# Patient Record
Sex: MALE | Race: WHITE | NOT HISPANIC OR LATINO | Employment: OTHER | URBAN - METROPOLITAN AREA
[De-identification: names, ages, dates, MRNs, and addresses within clinical notes are randomized per-mention and may not be internally consistent; named-entity substitution may affect disease eponyms.]

---

## 2017-04-27 ENCOUNTER — OFFICE VISIT (OUTPATIENT)
Dept: FAMILY MEDICINE CLINIC | Facility: CLINIC | Age: 82
End: 2017-04-27

## 2017-04-27 VITALS
WEIGHT: 190 LBS | OXYGEN SATURATION: 96 % | BODY MASS INDEX: 28.79 KG/M2 | HEIGHT: 68 IN | TEMPERATURE: 97.9 F | SYSTOLIC BLOOD PRESSURE: 141 MMHG | HEART RATE: 68 BPM | DIASTOLIC BLOOD PRESSURE: 70 MMHG

## 2017-04-27 DIAGNOSIS — M51.36 DEGENERATIVE DISC DISEASE, LUMBAR: ICD-10-CM

## 2017-04-27 DIAGNOSIS — M25.531 RIGHT WRIST PAIN: ICD-10-CM

## 2017-04-27 DIAGNOSIS — Z00.00 MEDICARE ANNUAL WELLNESS VISIT, INITIAL: Primary | ICD-10-CM

## 2017-04-27 DIAGNOSIS — E78.2 MIXED HYPERLIPIDEMIA: ICD-10-CM

## 2017-04-27 PROCEDURE — 99204 OFFICE O/P NEW MOD 45 MIN: CPT | Performed by: FAMILY MEDICINE

## 2017-04-27 PROCEDURE — G0439 PPPS, SUBSEQ VISIT: HCPCS | Performed by: FAMILY MEDICINE

## 2017-04-27 RX ORDER — PAROXETINE 10 MG/1
10 TABLET, FILM COATED ORAL EVERY MORNING
COMMUNITY
End: 2017-09-19 | Stop reason: SDUPTHER

## 2017-04-27 NOTE — PROGRESS NOTES
QUICK REFERENCE INFORMATION:  The ABCs of the Annual Wellness Visit    Initial Medicare Wellness Visit    HEALTH RISK ASSESSMENT    8/20/1930    Recent Hospitalizations:  No recent hospitalization(s)..        Current Medical Providers:  Patient Care Team:  Kvng Hogue MD as PCP - General (Family Medicine)  Sam Maravilla MD as Consulting Physician (Urology)  Martin Betancourt MD as Consulting Physician (Dermatology)        Smoking Status:  History   Smoking Status   • Never Smoker   Smokeless Tobacco   • Not on file       Alcohol Consumption:  History   Alcohol Use No       Depression Screen:   PHQ-9 Depression Screening 4/27/2017   Little interest or pleasure in doing things 0   Feeling down, depressed, or hopeless 0   Trouble falling or staying asleep, or sleeping too much 0   Feeling tired or having little energy 0   Poor appetite or overeating 0   Feeling bad about yourself - or that you are a failure or have let yourself or your family down 0   Trouble concentrating on things, such as reading the newspaper or watching television 0   Moving or speaking so slowly that other people could have noticed. Or the opposite - being so fidgety or restless that you have been moving around a lot more than usual 0   Thoughts that you would be better off dead, or of hurting yourself in some way 0   PHQ-9 Total Score 0   If you checked off any problems, how difficult have these problems made it for you to do your work, take care of things at home, or get along with other people? Not difficult at all       Health Habits and Functional and Cognitive Screening:  Functional & Cognitive Status 4/27/2017   Do you have difficulty preparing food and eating? No   Do you have difficulty bathing yourself? No   Do you have difficulty getting dressed? Yes   Do you have difficulty using the toilet? No   Do you have difficulty moving around from place to place? Yes   In the past year have you fallen or experienced a near fall? No   Do  you need help using the phone?  No   Are you deaf or do you have serious difficulty hearing?  No   Do you need help with transportation? No   Do you need help shopping? No   Do you need help preparing meals?  No   Do you need help with housework?  No   Do you need help with laundry? No   Do you need help taking your medications? No   Do you need help managing money? No   Do you have difficulty concentrating, remembering or making decisions? No       Health Habits  Current Diet: Well Balanced Diet  Dental Exam: Up to date  Eye Exam: Up to date  Exercise (times per week): 3 times per week  Current Exercise Activities Include: Cardiovasular Workout on Exercise Equipment          Does the patient have evidence of cognitive impairment? No    Asiprin use counseling: Taking ASA appropriately as indicated      Recent Lab Results:    Visual Acuity:  No exam data present    Age-appropriate Screening Schedule:  Refer to the list below for future screening recommendations based on patient's age, sex and/or medical conditions. Orders for these recommended tests are listed in the plan section. The patient has been provided with a written plan.    Health Maintenance   Topic Date Due   • TDAP/TD VACCINES (1 - Tdap) 08/20/1949   • ZOSTER VACCINE  04/27/2017   • PNEUMOCOCCAL VACCINES (65+ LOW/MEDIUM RISK) (2 of 2 - PPSV23) 10/12/2017   • INFLUENZA VACCINE  Completed        Subjective   History of Present Illness    Enrique Gillette is a 86 y.o. male who presents for an Annual Wellness Visit.    The following portions of the patient's history were reviewed and updated as appropriate: allergies, current medications, past family history, past medical history, past social history, past surgical history and problem list.    Outpatient Medications Prior to Visit   Medication Sig Dispense Refill   • acetaminophen (TYLENOL) 500 MG tablet Take 500 mg by mouth every 6 (six) hours as needed for mild pain (1-3).     • aspirin 81 MG EC tablet Take  "81 mg by mouth daily.     • CRESTOR 10 MG tablet      • Pyridoxine HCl (VITAMIN B-6 PO) Take  by mouth.     • Vitamin D, Cholecalciferol, 1000 UNITS capsule Take  by mouth.       No facility-administered medications prior to visit.        Patient Active Problem List   Diagnosis   • Stenosis, spinal, lumbar       Advance Care Planning:  has an advance directive - a copy HAS NOT been provided    Identification of Risk Factors:  Risk factors include: N/A.    Review of Systems    Compared to one year ago, the patient feels his physical health is the same.  Compared to one year ago, the patient feels his mental health is the same.    Objective     Physical Exam    Vitals:    04/27/17 0958   BP: 141/70   BP Location: Left arm   Patient Position: Sitting   Cuff Size: Adult   Pulse: 68   Temp: 97.9 °F (36.6 °C)   TempSrc: Oral   SpO2: 96%   Weight: 190 lb (86.2 kg)   Height: 68\" (172.7 cm)   PainSc:   6   PainLoc: Back       Body mass index is 28.89 kg/(m^2).  Discussed the patient's BMI with him. The BMI is in the acceptable range.    Assessment/Plan   Patient Self-Management and Personalized Health Advice  The patient has been provided with information about: exercise and preventive services including:   · Fall Risk assessment done.    Visit Diagnoses:  No diagnosis found.    No orders of the defined types were placed in this encounter.      Outpatient Encounter Prescriptions as of 4/27/2017   Medication Sig Dispense Refill   • acetaminophen (TYLENOL) 500 MG tablet Take 500 mg by mouth every 6 (six) hours as needed for mild pain (1-3).     • aspirin 81 MG EC tablet Take 81 mg by mouth daily.     • CRESTOR 10 MG tablet      • PARoxetine (PAXIL) 10 MG tablet Take 10 mg by mouth Every Morning.     • Pyridoxine HCl (VITAMIN B-6 PO) Take  by mouth.     • [DISCONTINUED] Vitamin D, Cholecalciferol, 1000 UNITS capsule Take  by mouth.       No facility-administered encounter medications on file as of 4/27/2017.        Reviewed use " of high risk medication in the elderly: yes  Reviewed for potential of harmful drug interactions in the elderly: not applicable    Follow Up:  No Follow-up on file.     An After Visit Summary and PPPS with all of these plans were given to the patient.        SUBJECTIVE:  The patient is an 86-year-old white male who is here for the first time.  He is in generally good health.  He has a history of hyperlipidemia and degenerative disc disease.  His current medications include Crestor 10 mg daily Paxil 10 mg daily vitamin B6 daily Tylenol as needed.  He complains of back pain however after extensive and thorough workup he finds relief in seen a chiropractor once a month.  He gets his prostate examined regularly.  He states he is up-to-date on colonoscopy.  He plays golf once a week.    Family history reviewed  surgical history reviewed  Social history reviewed  Allergies-none known     PAST MEDICAL HISTORY:  Reviewed.    REVIEW OF SYSTEMS:  Please see above; 14 point ROS otherwise negative.      OBJECTIVE: Vitals signs are reviewed and are stable.    HEENT: PERRLA. []  Neck:  Supple.[]  Lungs:  Clear.    Heart:  Regular rate and rhythm.[]  Abdomen:   Soft, nontender.[]  Extremities:  No cyanosis, clubbing or edema.[]Tenderness is present on the radial aspect of the right wrist.  Range of motion full.    ASSESSMENT:  Hyperlipidemia   tendinitis right wrist  Degenerative disc disease    PLAN: She will return for fasting labs.  Wrist splints as needed for tendinitis.  Diet and exercise discussed.  MedicationMuch of this encounter note is an electronic transcription/translation of spoken language to printed text.  The electronic translation of spoken language may permit erroneous, or at times, nonsensical words or phrases to be inadvertently transcribed.  Although I have reviewed the note for such errors, some may still exist. will be refilled.  Call if problems.                  []    PLAN []

## 2017-04-27 NOTE — PATIENT INSTRUCTIONS
Medicare Wellness  Personal Prevention Plan of Service     Date of Office Visit:  2017  Encounter Provider:  Kvng Hogue MD  Place of Service:  [facility]  Patient Name: Enrique Gillette  :  1930    As part of the Medicare Wellness portion of your visit today, we are providing you with this personalized preventive plan of services (PPPS). This plan is based upon recommendations of the [place] Preventive Services Task Force (USPSTF) and the Advisory Committee on Immunization Practices (ACIP).    This lists the preventive care services that should be considered, and provides dates of when you are due. Items listed as completed are up-to-date and do not require any further intervention.    Health Maintenance   Topic Date Due   • TDAP/TD VACCINES (1 - Tdap) 1949   • LIPID PANEL  2017   • ZOSTER VACCINE  2017   • MEDICARE ANNUAL WELLNESS  2017   • PNEUMOCOCCAL VACCINES (65+ LOW/MEDIUM RISK) (2 of 2 - PPSV23) 10/12/2017   • INFLUENZA VACCINE  Completed       No orders of the defined types were placed in this encounter.      No Follow-up on file.

## 2017-05-22 ENCOUNTER — LAB (OUTPATIENT)
Dept: FAMILY MEDICINE CLINIC | Facility: CLINIC | Age: 82
End: 2017-05-22

## 2017-05-22 DIAGNOSIS — E78.5 HYPERLIPIDEMIA, UNSPECIFIED HYPERLIPIDEMIA TYPE: Primary | ICD-10-CM

## 2017-05-22 LAB
ALBUMIN SERPL-MCNC: 4.1 G/DL (ref 3.5–5.2)
ALBUMIN/GLOB SERPL: 1.4 G/DL
ALP SERPL-CCNC: 62 U/L (ref 39–117)
ALT SERPL W P-5'-P-CCNC: 15 U/L (ref 1–41)
ANION GAP SERPL CALCULATED.3IONS-SCNC: 13 MMOL/L
AST SERPL-CCNC: 28 U/L (ref 1–40)
BILIRUB SERPL-MCNC: 0.6 MG/DL (ref 0.1–1.2)
BUN BLD-MCNC: 17 MG/DL (ref 8–23)
BUN/CREAT SERPL: 14.4 (ref 7–25)
CALCIUM SPEC-SCNC: 9.4 MG/DL (ref 8.6–10.5)
CHLORIDE SERPL-SCNC: 104 MMOL/L (ref 98–107)
CHOLEST SERPL-MCNC: 120 MG/DL (ref 0–200)
CO2 SERPL-SCNC: 26 MMOL/L (ref 22–29)
CREAT BLD-MCNC: 1.18 MG/DL (ref 0.76–1.27)
GFR SERPL CREATININE-BSD FRML MDRD: 59 ML/MIN/1.73
GLOBULIN UR ELPH-MCNC: 3 GM/DL
GLUCOSE BLD-MCNC: 94 MG/DL (ref 65–99)
HDLC SERPL-MCNC: 44 MG/DL (ref 40–60)
LDLC SERPL CALC-MCNC: 51 MG/DL (ref 0–100)
LDLC/HDLC SERPL: 1.15 {RATIO}
POTASSIUM BLD-SCNC: 4.4 MMOL/L (ref 3.5–5.2)
PROT SERPL-MCNC: 7.1 G/DL (ref 6–8.5)
SODIUM BLD-SCNC: 143 MMOL/L (ref 136–145)
TRIGL SERPL-MCNC: 127 MG/DL (ref 0–150)
VLDLC SERPL-MCNC: 25.4 MG/DL (ref 5–40)

## 2017-05-22 PROCEDURE — 80061 LIPID PANEL: CPT | Performed by: FAMILY MEDICINE

## 2017-05-22 PROCEDURE — 80053 COMPREHEN METABOLIC PANEL: CPT | Performed by: FAMILY MEDICINE

## 2017-06-28 ENCOUNTER — HOSPITAL ENCOUNTER (OUTPATIENT)
Dept: MRI IMAGING | Facility: HOSPITAL | Age: 82
Discharge: HOME | End: 2017-06-28
Admitting: NURSE PRACTITIONER

## 2017-06-28 ENCOUNTER — OFFICE VISIT (OUTPATIENT)
Dept: FAMILY MEDICINE CLINIC | Facility: CLINIC | Age: 82
End: 2017-06-28

## 2017-06-28 VITALS
SYSTOLIC BLOOD PRESSURE: 140 MMHG | HEIGHT: 68 IN | OXYGEN SATURATION: 97 % | RESPIRATION RATE: 16 BRPM | TEMPERATURE: 97.8 F | WEIGHT: 193 LBS | HEART RATE: 75 BPM | BODY MASS INDEX: 29.25 KG/M2 | DIASTOLIC BLOOD PRESSURE: 70 MMHG

## 2017-06-28 DIAGNOSIS — R42 DIZZINESS: Primary | ICD-10-CM

## 2017-06-28 DIAGNOSIS — H61.22 LEFT EAR IMPACTED CERUMEN: ICD-10-CM

## 2017-06-28 DIAGNOSIS — R42 DIZZINESS: ICD-10-CM

## 2017-06-28 PROCEDURE — 99214 OFFICE O/P EST MOD 30 MIN: CPT | Performed by: NURSE PRACTITIONER

## 2017-06-28 PROCEDURE — 0 GADOBENATE DIMEGLUMINE 529 MG/ML SOLUTION: Performed by: NURSE PRACTITIONER

## 2017-06-28 PROCEDURE — 70553 MRI BRAIN STEM W/O & W/DYE: CPT

## 2017-06-28 PROCEDURE — 82565 ASSAY OF CREATININE: CPT

## 2017-06-28 PROCEDURE — 69209 REMOVE IMPACTED EAR WAX UNI: CPT | Performed by: NURSE PRACTITIONER

## 2017-06-28 PROCEDURE — A9577 INJ MULTIHANCE: HCPCS | Performed by: NURSE PRACTITIONER

## 2017-06-28 RX ADMIN — GADOBENATE DIMEGLUMINE 18 ML: 529 INJECTION, SOLUTION INTRAVENOUS at 17:29

## 2017-06-28 NOTE — NURSING NOTE
MRI Brain read by Dr Skinner:  No acute process..Results called by Dr Skinner to Venecia KIM.  Pt may go home.  IV site clear, removed.  Discharged to home via wheelchair and cane with help, roopa well.

## 2017-06-28 NOTE — PROGRESS NOTES
Subjective   Enrique Gillette is a 86 y.o. male.     History of Present Illness   C/o HTN, he states he checks his BP at home with a wrist cuff and has had elevated readings, he states he has been dizzy, uses a walker at home, he states his head is spinning, he does not take BP medication, he states he went to [facility] and checked his BP and it was 140/80s. He states at home with his wrist BP cuff was 122/69, HR 65, 136/72 HR 63. On right 190/100, 202/95. He denies HA, CP, SOA, change in vision, LE edema. 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 He states the dizziness started yesterday morning when he got out of bed, he used his walker to sit down. He states he sat down on the couch, ate breakfast and got better, he states the same thing happened this morning. He states he feels like the room is spinning, states he felt weak, denies confusion, lives with wife. He states he has been on paxil about 9 months. He denies urinary symptoms, palpitations. Dizziness is a new problem for him, his wife is in the exam room today. He has never seen cardiology. He does have a hx of cerumen impaction. Denies N/V.    The following portions of the patient's history were reviewed and updated as appropriate: allergies, current medications, past family history, past medical history, past social history, past surgical history and problem list.    Review of Systems   Constitutional: Negative for chills, diaphoresis, fatigue and fever.   HENT: Negative for congestion, ear pain, sinus pressure and sore throat.    Eyes: Negative for photophobia, pain and visual disturbance.   Respiratory: Negative for cough and shortness of breath.    Cardiovascular: Negative for chest pain, palpitations and leg swelling.   Musculoskeletal: Negative for arthralgias and myalgias.   Skin: Negative for pallor.   Neurological: Positive for dizziness. Negative for tremors, seizures, syncope, speech difficulty, weakness, light-headedness, numbness and headaches.    Psychiatric/Behavioral: Negative for behavioral problems and confusion.   All other systems reviewed and are negative.      Objective   Physical Exam   Constitutional: He is oriented to person, place, and time. He appears well-developed and well-nourished.   HENT:   Head: Normocephalic.   Right Ear: Tympanic membrane and ear canal normal.   Nose: Nose normal.   Mouth/Throat: Uvula is midline, oropharynx is clear and moist and mucous membranes are normal.   Left cerumen impaction   Eyes: EOM are normal. Pupils are equal, round, and reactive to light.   Neck: Normal range of motion. Neck supple. Carotid bruit is not present. No thyromegaly present.   Cardiovascular: Normal rate, regular rhythm and normal heart sounds.    Pulses:       Radial pulses are 2+ on the right side, and 2+ on the left side.        Dorsalis pedis pulses are 2+ on the right side, and 2+ on the left side.        Posterior tibial pulses are 2+ on the right side, and 2+ on the left side.   Pulmonary/Chest: Effort normal and breath sounds normal. No respiratory distress. He has no wheezes.   Musculoskeletal: Normal range of motion.   Lymphadenopathy:     He has no cervical adenopathy.   Neurological: He is alert and oriented to person, place, and time. He has normal strength. No cranial nerve deficit or sensory deficit. He displays a negative Romberg sign.   Skin: Skin is warm and dry.   Psychiatric: He has a normal mood and affect. His behavior is normal. Judgment and thought content normal.   Nursing note and vitals reviewed.  left TM WNL after lavage.     Assessment/Plan   Enrique was seen today for hypertension.    Diagnoses and all orders for this visit:    Dizziness  -     Cancel: MRI Brain With & Without Contrast; Future  -     MRI Brain With & Without Contrast; Future    Left ear impacted cerumen      Left ear lavage today, tolerated well.   Reviewed plan of care with Dr. Kvng Hogue.   D/t patient's age and symptoms, MRI brain ordered  today, will call with results.  He will f/u in office in about 1 week.   He was educated about falls precautions, he should use his walker, not cane, for balance. He should move from lying to sitting to standing very slowly and count to 10 secs at least in between position changes.   If symptoms persist or any problems or other symptoms he should go to the nearest ER or call 911.  If persists, will consider cbc, ekg, bs, ent refer.   Increase fluid intake, get plenty of rest.   Patient agrees with plan of care and understands instructions. Call if worsening symptoms or any problems or concerns.

## 2017-06-28 NOTE — PATIENT INSTRUCTIONS
Left ear lavage today,   Reviewed plan of care with Dr. Kvng Hogue.   D/t patient's age and symptoms, MRI brain ordered today, will call with results.  He will f/u in office in about 1 week.   He was educated about falls precautions, he should use his walker, not cane, for balance. He should move from lying to sitting to standing very slowly and count to 10 secs at least in between position changes.   If symptoms persist or any problems or other symptoms he should go to the nearest ER or call 911.  If persists, will consider cbc, ekg, ent refer.   Increase fluid intake, get plenty of rest.   Patient agrees with plan of care and understands instructions. Call if worsening symptoms or any problems or concerns.

## 2017-06-29 ENCOUNTER — DOCUMENTATION (OUTPATIENT)
Dept: FAMILY MEDICINE CLINIC | Facility: CLINIC | Age: 82
End: 2017-06-29

## 2017-06-29 LAB — CREAT BLDA-MCNC: 1.1 MG/DL (ref 0.6–1.3)

## 2017-06-29 RX ORDER — MECLIZINE HCL 12.5 MG/1
12.5 TABLET ORAL 3 TIMES DAILY PRN
Qty: 12 TABLET | Refills: 0 | Status: SHIPPED | OUTPATIENT
Start: 2017-06-29 | End: 2018-06-12

## 2017-06-29 NOTE — PROGRESS NOTES
Called patient, informed or normal brain MRI for age, spoke with Dr. Hogue about plan of care, will trial meclizine 12.5mg up to 3x day as needed for dizziness, only take as needed, he will call with progress tomorrow, and f/u with Dr. Hogue next week. He will go to the ER if symptoms persist or worsen. Patient and wife understands instructions.

## 2017-06-30 ENCOUNTER — TELEPHONE (OUTPATIENT)
Dept: FAMILY MEDICINE CLINIC | Facility: CLINIC | Age: 82
End: 2017-06-30

## 2017-07-03 ENCOUNTER — TELEPHONE (OUTPATIENT)
Dept: FAMILY MEDICINE CLINIC | Facility: CLINIC | Age: 82
End: 2017-07-03

## 2017-07-05 ENCOUNTER — TELEPHONE (OUTPATIENT)
Dept: FAMILY MEDICINE CLINIC | Facility: CLINIC | Age: 82
End: 2017-07-05

## 2017-09-19 RX ORDER — PAROXETINE 10 MG/1
10 TABLET, FILM COATED ORAL EVERY MORNING
Qty: 30 TABLET | Refills: 2 | Status: SHIPPED | OUTPATIENT
Start: 2017-09-19 | End: 2018-06-12 | Stop reason: ALTCHOICE

## 2018-03-12 ENCOUNTER — TELEPHONE (OUTPATIENT)
Dept: FAMILY MEDICINE CLINIC | Facility: CLINIC | Age: 83
End: 2018-03-12

## 2018-03-12 RX ORDER — ROSUVASTATIN CALCIUM 10 MG/1
10 TABLET, COATED ORAL DAILY
Qty: 90 TABLET | Refills: 0 | Status: SHIPPED | OUTPATIENT
Start: 2018-03-12 | End: 2018-03-12 | Stop reason: SDUPTHER

## 2018-03-12 RX ORDER — ROSUVASTATIN CALCIUM 10 MG/1
10 TABLET, COATED ORAL DAILY
Qty: 90 TABLET | Refills: 0 | Status: SHIPPED | OUTPATIENT
Start: 2018-03-12 | End: 2018-06-11 | Stop reason: SDUPTHER

## 2018-03-20 ENCOUNTER — PRIOR AUTHORIZATION (OUTPATIENT)
Dept: FAMILY MEDICINE CLINIC | Facility: CLINIC | Age: 83
End: 2018-03-20

## 2018-03-20 NOTE — TELEPHONE ENCOUNTER
03/20 Called [facility] and spoke with Giorgi. PA was approved for Rosuvastatin from 02-18-18 through 03-20-19. PA approval # is [id number]. Pt has been notified.

## 2018-05-01 ENCOUNTER — OFFICE VISIT (OUTPATIENT)
Dept: FAMILY MEDICINE CLINIC | Facility: CLINIC | Age: 83
End: 2018-05-01

## 2018-05-01 VITALS
WEIGHT: 194 LBS | TEMPERATURE: 98.4 F | DIASTOLIC BLOOD PRESSURE: 70 MMHG | HEIGHT: 68 IN | BODY MASS INDEX: 29.4 KG/M2 | SYSTOLIC BLOOD PRESSURE: 116 MMHG | HEART RATE: 46 BPM | OXYGEN SATURATION: 99 %

## 2018-05-01 DIAGNOSIS — I45.10 RIGHT BUNDLE BRANCH BLOCK: ICD-10-CM

## 2018-05-01 DIAGNOSIS — M25.561 RIGHT KNEE PAIN, UNSPECIFIED CHRONICITY: Primary | ICD-10-CM

## 2018-05-01 PROCEDURE — 73560 X-RAY EXAM OF KNEE 1 OR 2: CPT | Performed by: FAMILY MEDICINE

## 2018-05-01 PROCEDURE — 99213 OFFICE O/P EST LOW 20 MIN: CPT | Performed by: FAMILY MEDICINE

## 2018-05-01 PROCEDURE — 93000 ELECTROCARDIOGRAM COMPLETE: CPT | Performed by: FAMILY MEDICINE

## 2018-05-01 NOTE — PROGRESS NOTES
SUBJECTIVE:  The patient is an 87-year-old white male who comes in primarily because of his right knee swelling.  Upon initial exam his vital signs showed a bradycardia and irregular beat.  He knows of no heart issues.  He denies chest pain or shortness of breath.  He appears to have some minor cognitive impairment.  He is here by himself.  I saw him once before with his wife and she definitely had cognitive impairment.    PAST MEDICAL HISTORY:  Reviewed.    REVIEW OF SYSTEMS:  Please see above; 14 point ROS negative.      OBJECTIVE: Vitals signs are reviewed and are stable.    HEENT: PERRLA.   Neck:  Supple.   Lungs:  Clear.    Heart:  Regular rate and rhythm.   Abdomen:   Soft, nontender.   Extremities:  No cyanosis, clubbing or edema.       ECG 12 Lead  Date/Time: 5/1/2018 11:48 AM  Performed by: CAESAR MARION  Authorized by: CAESAR MARION   Rhythm: sinus rhythm  Conduction: right bundle branch block        EKG is done here and interpreted by me.  None for comparison.  Indication arrhythmia.  EKG shows sinus rhythm with right bundle branch block.  Two-view x-ray of the right knee is done here and interpreted by me.  Indication right knee pain.  No old x-ray for comparison.  X-ray shows degenerative changes.  ASSESSMENT:    Right knee pain  Right bundle branch block  Hyperlipidemia      PLAN: The patient is totally asymptomatic regarding chest pain or shortness of breath.  I will refer him to a cardiologist.  He is advised should he develop any symptoms to go to the emergency room.  He will be referred to an orthopedist for his knee pain.    Dictated utilizing Dragon dictation.

## 2018-05-14 ENCOUNTER — TELEPHONE (OUTPATIENT)
Dept: FAMILY MEDICINE CLINIC | Facility: CLINIC | Age: 83
End: 2018-05-14

## 2018-05-21 ENCOUNTER — OFFICE VISIT (OUTPATIENT)
Dept: ORTHOPEDIC SURGERY | Facility: CLINIC | Age: 83
End: 2018-05-21

## 2018-05-21 VITALS — WEIGHT: 194 LBS | TEMPERATURE: 97.4 F | BODY MASS INDEX: 29.4 KG/M2 | HEIGHT: 68 IN

## 2018-05-21 DIAGNOSIS — M48.062 SPINAL STENOSIS OF LUMBAR REGION WITH NEUROGENIC CLAUDICATION: Primary | ICD-10-CM

## 2018-05-21 DIAGNOSIS — M17.11 ARTHRITIS OF RIGHT KNEE: ICD-10-CM

## 2018-05-21 PROCEDURE — 99204 OFFICE O/P NEW MOD 45 MIN: CPT | Performed by: ORTHOPAEDIC SURGERY

## 2018-05-21 NOTE — PROGRESS NOTES
"Patient Name: Enrique Gillette   YOB: 1930  Referring Primary Care Physician: Kvng Hogue MD  BMI: Body mass index is 29.5 kg/m².    Chief Complaint:    Chief Complaint   Patient presents with   • Right Knee - Establish Care, Pain    \"my back hurts all the way across\"    Subjective:    HPI:   Enrique Gillette is a pleasant 87 y.o. year old who presents today for evaluation of   Chief Complaint   Patient presents with   • Right Knee - Establish Care, Pain   86 yo man with known spinal stenosis.  Pain radiates down the right leg when he ambulates.  Saw dr silva a few yrs ago.  Had 5 epidurals and did 12 PT visits, none of which helped.  Was golfing but has given up.  Uses a cane.  Does airdyne 10-15 minutes daily.  choro weekly helps. Retired from [employer].  Has one kidney so not supposed to take nsaids.  Tylenol helps very litte.  With papin radiating down the right leg near knee wanted to check.      This problem is new to this examiner.         Medications:   Home Medications:  Current Outpatient Prescriptions on File Prior to Visit   Medication Sig   • acetaminophen (TYLENOL) 500 MG tablet Take 500 mg by mouth every 6 (six) hours as needed for mild pain (1-3).   • aspirin 81 MG EC tablet Take 81 mg by mouth daily.   • meclizine (ANTIVERT) 12.5 MG tablet Take 1 tablet by mouth 3 (Three) Times a Day As Needed for dizziness.   • PARoxetine (PAXIL) 10 MG tablet Take 1 tablet by mouth Every Morning.   • Pyridoxine HCl (VITAMIN B-6 PO) Take  by mouth.   • rosuvastatin (CRESTOR) 10 MG tablet Take 1 tablet by mouth Daily.     No current facility-administered medications on file prior to visit.      Current Medications:  Scheduled Meds:  Continuous Infusions:  No current facility-administered medications for this visit.   PRN Meds:.    I have reviewed the patient's medical history in detail and updated the computerized patient record.  Review and summarization of old records includes:    Past Medical History: " "  Diagnosis Date   • Prostate cancer         Past Surgical History:   Procedure Laterality Date   • COLONOSCOPY  2015        Social History     Occupational History   • Not on file.     Social History Main Topics   • Smoking status: Never Smoker   • Smokeless tobacco: Not on file   • Alcohol use No   • Drug use: No   • Sexual activity: Not on file      Social History     Social History Narrative   • No narrative on file        Family History   Problem Relation Age of Onset   • No Known Problems Mother    • No Known Problems Father    • No Known Problems Brother    • No Known Problems Brother    • No Known Problems Brother    • No Known Problems Brother        ROS: 14 point review of systems was performed and all other systems were reviewed and are negative except for documented findings in HPI and today's encounter.     Allergies: No Known Allergies  Constitutional:  Denies fever, shaking or chills   Eyes:  Denies change in visual acuity   HENT:  Denies nasal congestion or sore throat   Respiratory:  Denies cough or shortness of breath   Cardiovascular:  Denies chest pain or severe LE edema   GI:  Denies abdominal pain, nausea, vomiting, bloody stools or diarrhea   Musculoskeletal:  Numbness, tingling, pain, or loss of motor function only as noted above in history of present illness.  : Denies painful urination or hematuria  Integument:  Denies rash, lesion or ulceration   Neurologic:  Denies headache or focal weakness  Endocrine:  Denies lymphadenopathy  Psych:  Denies confusion or change in mental status   Hem:  Denies active bleeding    Subjective     Objective:    Physical Exam: 87 y.o. male  Wt Readings from Last 3 Encounters:   05/21/18 88 kg (194 lb)   05/01/18 88 kg (194 lb)   06/28/17 87.5 kg (193 lb)     Ht Readings from Last 3 Encounters:   05/21/18 172.7 cm (68\")   05/01/18 172.7 cm (68\")   06/28/17 172.7 cm (68\")     Body mass index is 29.5 kg/m².    Vitals:    05/21/18 0940   Temp: 97.4 °F (36.3 °C) "       Vital signs reviewed.   General Appearance:    Alert, cooperative, in no acute distress                  Eyes: conjunctiva clear  ENT: external ears and nose atraumatic  CV: no peripheral edema  Resp: normal respiratory effort  Skin: no rashes or wounds; normal turgor  Psych: mood and affect appropriate  Lymph: no nodes appreciated  Neuro: gross sensation intact  Vascular:  Palpable peripheral pulse in noted extremity  Musculoskeletal Extremities: good rom and stability.  no tenderness to palp or effusion.      Radiology:   Imaging done by outside location, images were personally viewed and discussed at length with the patient:    Indication: pain related symptoms,  Views: 2V AP&LAT right knee(s)   Findings: moderate arthritis  Comparison views: not available  Reviewed lumbar MRI that shows lumbar spinal stenosis    Assessment:     ICD-10-CM ICD-9-CM   1. Spinal stenosis of lumbar region with neurogenic claudication M48.062 724.03   2. Arthritis of right knee M17.11 716.96        Procedures       Plan: Biomechanics of pertinent body area discussed.  Risks, benefits, alternatives, comparisons, and complications of accepted medicines, injections, recommendations, surgical procedures, and therapies explained and education provided in laymen's terms. The patient was given the opportunity to ask questions and they were answerved to their satisfaction.   Natural history and expected course of this patient's diagnosis discussed along with evaluation of therapies. Questions answered.   Appears that the knee is not causing current symptomatology and recommended to see dr meier to review options for spinal stenosis.  Reviewed DR Meier's notes    5/21/2018

## 2018-06-11 RX ORDER — ROSUVASTATIN CALCIUM 10 MG/1
TABLET, COATED ORAL
Qty: 90 TABLET | Refills: 0 | Status: SHIPPED | OUTPATIENT
Start: 2018-06-11 | End: 2018-09-08 | Stop reason: SDUPTHER

## 2018-06-12 ENCOUNTER — OFFICE VISIT (OUTPATIENT)
Dept: CARDIOLOGY | Facility: CLINIC | Age: 83
End: 2018-06-12

## 2018-06-12 VITALS
HEART RATE: 67 BPM | SYSTOLIC BLOOD PRESSURE: 136 MMHG | HEIGHT: 68 IN | WEIGHT: 192 LBS | DIASTOLIC BLOOD PRESSURE: 84 MMHG | BODY MASS INDEX: 29.1 KG/M2

## 2018-06-12 DIAGNOSIS — E78.2 MIXED HYPERLIPIDEMIA: ICD-10-CM

## 2018-06-12 DIAGNOSIS — I45.10 RBBB: Primary | ICD-10-CM

## 2018-06-12 DIAGNOSIS — I49.1 APC (ATRIAL PREMATURE CONTRACTIONS): ICD-10-CM

## 2018-06-12 PROCEDURE — 99204 OFFICE O/P NEW MOD 45 MIN: CPT | Performed by: INTERNAL MEDICINE

## 2018-06-12 PROCEDURE — 93000 ELECTROCARDIOGRAM COMPLETE: CPT | Performed by: INTERNAL MEDICINE

## 2018-06-12 RX ORDER — ACETAMINOPHEN,DIPHENHYDRAMINE HCL 500; 25 MG/1; MG/1
1 TABLET, FILM COATED ORAL NIGHTLY PRN
COMMUNITY
End: 2021-10-15

## 2018-06-12 NOTE — PROGRESS NOTES
Date of Office Visit: 18  Encounter Provider: Ko Castillo MD  Place of Service: [facility] CARDIOLOGY  Patient Name: Enrique Gillette  :1930  Referral Provider:Kvng Hogue MD      Chief Complaint   Patient presents with   • Irregular Heart Beat     History of Present Illness  Mr Gillette is a pleasant 88 yo gentleman with no prior history of heart disease he does have a history of prostate cancer, hyperlipidemia, and previous normal stress testing in  had a nuclear stress is that was normal.  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  However he denies any symptoms of chest pain or pressure.  He denies any shortness of breath, orthopnea, or PND.  He denies any palpitations, near-syncope or syncope.  He does walk with a walker or a cane but his had no falling spells.  Denies any abrupt loss of vision, paralysis, paresthesia, or dysarthria.  Overall he feels like he's doing very well he has been playing golf regularly hasn't yet this year.  He does get some hot flashes which she said he's had as a residual from when he received radiation therapy for his prostate cancer.  In his biggest problem he has is his lumbar disc disease with sciatica.          Past Medical History:   Diagnosis Date   • Chest pain    • Hyperlipidemia    • Prostate CA    • Prostate cancer    • RBBB    • Right knee pain    • SOB (shortness of breath)          Past Surgical History:   Procedure Laterality Date   • CATARACT EXTRACTION Bilateral    • COLONOSCOPY           Current Outpatient Prescriptions on File Prior to Visit   Medication Sig Dispense Refill   • acetaminophen (TYLENOL) 500 MG tablet Take 500 mg by mouth every 6 (six) hours as needed for mild pain (1-3).     • aspirin 81 MG EC tablet Take 81 mg by mouth daily.     • rosuvastatin  (CRESTOR) 10 MG tablet TAKE 1 TABLET DAILY 90 tablet 0   • [DISCONTINUED] meclizine (ANTIVERT) 12.5 MG tablet Take 1 tablet by mouth 3 (Three) Times a Day As Needed for dizziness. 12 tablet 0   • [DISCONTINUED] PARoxetine (PAXIL) 10 MG tablet Take 1 tablet by mouth Every Morning. 30 tablet 2   • [DISCONTINUED] Pyridoxine HCl (VITAMIN B-6 PO) Take  by mouth.       No current facility-administered medications on file prior to visit.          Social History     Social History   • Marital status:      Spouse name: N/A   • Number of children: N/A   • Years of education: N/A     Occupational History   • Not on file.     Social History Main Topics   • Smoking status: Never Smoker   • Smokeless tobacco: Not on file   • Alcohol use No   • Drug use: No   • Sexual activity: Not on file     Other Topics Concern   • Not on file     Social History Narrative   • No narrative on file       Family History   Problem Relation Age of Onset   • No Known Problems Mother    • No Known Problems Father    • No Known Problems Brother    • No Known Problems Brother    • No Known Problems Brother    • No Known Problems Brother          Review of Systems   Constitution: Negative for decreased appetite, diaphoresis, fever, weakness, malaise/fatigue, weight gain and weight loss.   HENT: Negative for congestion, hearing loss, nosebleeds and tinnitus.    Eyes: Negative for blurred vision, double vision, vision loss in left eye, vision loss in right eye and visual disturbance.   Cardiovascular:        As noted in HPI   Respiratory:        As noted HPI   Endocrine: Negative for cold intolerance and heat intolerance.   Hematologic/Lymphatic: Negative for bleeding problem. Does not bruise/bleed easily.   Skin: Negative for color change, flushing, itching and rash.   Musculoskeletal: Positive for back pain, joint pain and muscle weakness. Negative for arthritis, joint swelling and myalgias.   Gastrointestinal: Negative for bloating, abdominal  "pain, constipation, diarrhea, dysphagia, heartburn, hematemesis, hematochezia, melena, nausea and vomiting.   Genitourinary: Negative for bladder incontinence, dysuria, frequency, nocturia and urgency.   Neurological: Negative for dizziness, focal weakness, headaches, light-headedness, loss of balance, numbness, paresthesias and vertigo.   Psychiatric/Behavioral: Negative for depression, memory loss and substance abuse.       Procedures      ECG 12 Lead  Date/Time: 6/12/2018 9:32 AM  Performed by: VERNELL GÓMEZ  Authorized by: VERNELL GÓMEZ   Comparison: compared with previous ECG   Similar to previous ECG  Rhythm: sinus rhythm  Ectopy: atrial premature contractions  Rate: normal  Conduction: incomplete RBBB, LAFB and 1st degree  QRS axis: left                  Objective:    /84 (BP Location: Right arm)   Pulse 67   Ht 172.7 cm (68\")   Wt 87.1 kg (192 lb)   BMI 29.19 kg/m²        Physical Exam  Physical Exam   Constitutional: He is oriented to person, place, and time. He appears well-developed and well-nourished. No distress.   HENT:   Head: Normocephalic.   Eyes: Conjunctivae are normal. Pupils are equal, round, and reactive to light. No scleral icterus.   Neck: Normal carotid pulses, no hepatojugular reflux and no JVD present. Carotid bruit is not present. No tracheal deviation, no edema and no erythema present. No thyromegaly present.   Cardiovascular: Normal rate, regular rhythm, S1 normal, S2 normal, normal heart sounds and intact distal pulses.   No extrasystoles are present. PMI is not displaced.  Exam reveals no gallop, no distant heart sounds and no friction rub.    No murmur heard.  Pulses:       Carotid pulses are 2+ on the right side, and 2+ on the left side.       Radial pulses are 2+ on the right side, and 2+ on the left side.        Femoral pulses are 2+ on the right side, and 2+ on the left side.       Dorsalis pedis pulses are 2+ on the right side, and 2+ on the left side.        " Posterior tibial pulses are 2+ on the right side, and 2+ on the left side.   Pulmonary/Chest: Effort normal and breath sounds normal. No respiratory distress. He has no decreased breath sounds. He has no wheezes. He has no rhonchi. He has no rales. He exhibits no tenderness.   Abdominal: Soft. Bowel sounds are normal. He exhibits no distension and no mass. There is no hepatosplenomegaly. There is no tenderness. There is no rebound and no guarding.   Musculoskeletal: He exhibits no edema, tenderness or deformity.   Neurological: He is alert and oriented to person, place, and time.   Skin: Skin is warm and dry. No rash noted. He is not diaphoretic. No cyanosis or erythema. No pallor. Nails show no clubbing.   Psychiatric: He has a normal mood and affect. His speech is normal and behavior is normal. Judgment and thought content normal.           Assessment:   1. 87-year-old gentleman with incomplete right bundle branch block, first-degree AV block, left interfascicular block.  And APCs.  But completely asymptomatic.  Since he's asymptomatic and a normal physical exam would not recommend any further evaluation or treatment we did discuss options of doing testing such as echocardiography but again not likely to make any decision changes based on that therefore he still follow for any symptoms of dizziness or lightheadedness or shortness of breath and will call us if any symptoms occur.  2 hyperlipidemia continue home therapy.  3.  History prostate cancer treated with radiation therapy.          Plan:

## 2018-07-18 ENCOUNTER — OFFICE VISIT (OUTPATIENT)
Dept: FAMILY MEDICINE CLINIC | Facility: CLINIC | Age: 83
End: 2018-07-18

## 2018-07-18 VITALS
HEIGHT: 68 IN | SYSTOLIC BLOOD PRESSURE: 120 MMHG | OXYGEN SATURATION: 97 % | WEIGHT: 192 LBS | BODY MASS INDEX: 29.1 KG/M2 | DIASTOLIC BLOOD PRESSURE: 76 MMHG | TEMPERATURE: 98.3 F | HEART RATE: 73 BPM

## 2018-07-18 DIAGNOSIS — F41.9 ANXIETY: ICD-10-CM

## 2018-07-18 DIAGNOSIS — E78.5 HYPERLIPIDEMIA, UNSPECIFIED HYPERLIPIDEMIA TYPE: Primary | ICD-10-CM

## 2018-07-18 DIAGNOSIS — M19.90 ARTHRITIS: ICD-10-CM

## 2018-07-18 PROCEDURE — 99213 OFFICE O/P EST LOW 20 MIN: CPT | Performed by: FAMILY MEDICINE

## 2018-07-18 RX ORDER — MECLIZINE HCL 12.5 MG/1
12.5 TABLET ORAL 3 TIMES DAILY PRN
Qty: 21 TABLET | Refills: 0 | Status: SHIPPED | OUTPATIENT
Start: 2018-07-18 | End: 2018-08-27 | Stop reason: SDUPTHER

## 2018-07-18 RX ORDER — PAROXETINE 10 MG/1
10 TABLET, FILM COATED ORAL EVERY MORNING
Qty: 90 TABLET | Refills: 3 | Status: SHIPPED | OUTPATIENT
Start: 2018-07-18 | End: 2019-06-23 | Stop reason: SDUPTHER

## 2018-07-18 NOTE — PROGRESS NOTES
SUBJECTIVE:  The patient is an 87-year-old white male who comes in primarily with his wife today who is cognitively impaired and needs help.  He does have a few medical problems including hyperlipidemia.  He is not exactly due labs today.  He's had a great deal of difficulty caring for his wife because of confusion over medications.  Please refer to her visit today and both patient's previous visit.  He feels okay.  He recently saw a cardiologist for abnormal findings on EKG.  However he is totally asymptomatic cardiac-wise.  He is under a lot of stress taking care of his wife.  He wants to get back on Paxil.  His meclizine needs refilling that he has for dizziness on occasion.    PAST MEDICAL HISTORY:  Reviewed.    REVIEW OF SYSTEMS:  Please see above; 14 point ROS negative.      OBJECTIVE: Vitals signs are reviewed and are stable.    HEENT: PERRLA.   Neck:  Supple.   Lungs:  Clear.    Heart:  Regular rate and rhythm.   Abdomen:   Soft, nontender.   Extremities:  No cyanosis, clubbing or edema.     ASSESSMENT:   Hyperlipidemia  Arthritis.   Anxiety  History of vertigo    PLAN: We are gathering information for his wife in someone that's and are in can help these unfortunate people manage her medication.  Her children live out of town.  The patient is advised on healthy lifestyle.  He'll be due lab in a couple months.  He will call if any problems in the interim.  His to requested medications are refilled.  He will call me if any problems with him or his wife.    Dictated utilizing Dragon dictation.

## 2018-08-27 ENCOUNTER — TELEPHONE (OUTPATIENT)
Dept: FAMILY MEDICINE CLINIC | Facility: CLINIC | Age: 83
End: 2018-08-27

## 2018-08-27 RX ORDER — MECLIZINE HCL 12.5 MG/1
12.5 TABLET ORAL 3 TIMES DAILY PRN
Qty: 30 TABLET | Refills: 2 | Status: SHIPPED | OUTPATIENT
Start: 2018-08-27

## 2018-08-27 NOTE — TELEPHONE ENCOUNTER
Is calling bc he needs meclizine (ANTIVERT) 12.5 MG tablet he prefers the generic, and this [facility] please let him have a refill, and let him know when it has been ok'ed and called in to them please. He said dr oneill does not want him to be without it

## 2018-09-08 RX ORDER — ROSUVASTATIN CALCIUM 10 MG/1
TABLET, COATED ORAL
Qty: 90 TABLET | Refills: 0 | Status: SHIPPED | OUTPATIENT
Start: 2018-09-08 | End: 2018-12-07 | Stop reason: SDUPTHER

## 2018-11-28 ENCOUNTER — OFFICE VISIT (OUTPATIENT)
Dept: FAMILY MEDICINE CLINIC | Facility: CLINIC | Age: 83
End: 2018-11-28

## 2018-11-28 VITALS
BODY MASS INDEX: 28.79 KG/M2 | HEART RATE: 70 BPM | WEIGHT: 190 LBS | RESPIRATION RATE: 18 BRPM | TEMPERATURE: 98 F | SYSTOLIC BLOOD PRESSURE: 120 MMHG | OXYGEN SATURATION: 96 % | HEIGHT: 68 IN | DIASTOLIC BLOOD PRESSURE: 64 MMHG

## 2018-11-28 DIAGNOSIS — E78.2 MIXED HYPERLIPIDEMIA: ICD-10-CM

## 2018-11-28 DIAGNOSIS — Z00.00 MEDICARE ANNUAL WELLNESS VISIT, SUBSEQUENT: Primary | ICD-10-CM

## 2018-11-28 LAB
ALBUMIN SERPL-MCNC: 3.8 G/DL (ref 3.5–5.2)
ALBUMIN/GLOB SERPL: 1.1 G/DL
ALP SERPL-CCNC: 69 U/L (ref 39–117)
ALT SERPL W P-5'-P-CCNC: 12 U/L (ref 1–41)
ANION GAP SERPL CALCULATED.3IONS-SCNC: 10.4 MMOL/L
AST SERPL-CCNC: 24 U/L (ref 1–40)
BILIRUB SERPL-MCNC: 0.4 MG/DL (ref 0.1–1.2)
BUN BLD-MCNC: 16 MG/DL (ref 8–23)
BUN/CREAT SERPL: 12.6 (ref 7–25)
CALCIUM SPEC-SCNC: 9.4 MG/DL (ref 8.6–10.5)
CHLORIDE SERPL-SCNC: 104 MMOL/L (ref 98–107)
CHOLEST SERPL-MCNC: 122 MG/DL (ref 0–200)
CO2 SERPL-SCNC: 25.6 MMOL/L (ref 22–29)
CREAT BLD-MCNC: 1.27 MG/DL (ref 0.76–1.27)
ERYTHROCYTE [DISTWIDTH] IN BLOOD BY AUTOMATED COUNT: 13.8 % (ref 4.5–15)
GFR SERPL CREATININE-BSD FRML MDRD: 54 ML/MIN/1.73
GLOBULIN UR ELPH-MCNC: 3.6 GM/DL
GLUCOSE BLD-MCNC: 92 MG/DL (ref 65–99)
HCT VFR BLD AUTO: 45.4 % (ref 35–60)
HDLC SERPL-MCNC: 39 MG/DL (ref 40–60)
HGB BLD-MCNC: 14.2 G/DL (ref 13.5–18)
LDLC SERPL CALC-MCNC: 48 MG/DL (ref 0–100)
LDLC/HDLC SERPL: 1.23 {RATIO}
LYMPHOCYTES # BLD AUTO: 2 10*3/MM3 (ref 1.2–3.4)
LYMPHOCYTES NFR BLD AUTO: 26.4 % (ref 21–51)
MCH RBC QN AUTO: 28 PG (ref 26.1–33.1)
MCHC RBC AUTO-ENTMCNC: 31.2 G/DL (ref 33–37)
MCV RBC AUTO: 89.9 FL (ref 80–99)
MONOCYTES # BLD AUTO: 0.6 10*3/MM3 (ref 0.1–0.6)
MONOCYTES NFR BLD AUTO: 7.6 % (ref 2–9)
NEUTROPHILS # BLD AUTO: 4.9 10*3/MM3 (ref 1.4–6.5)
NEUTROPHILS NFR BLD AUTO: 66 % (ref 42–75)
PLATELET # BLD AUTO: 210 10*3/MM3 (ref 150–450)
PMV BLD AUTO: 8.4 FL (ref 7.1–10.5)
POTASSIUM BLD-SCNC: 4.4 MMOL/L (ref 3.5–5.2)
PROT SERPL-MCNC: 7.4 G/DL (ref 6–8.5)
RBC # BLD AUTO: 5.05 10*6/MM3 (ref 4–6)
SODIUM BLD-SCNC: 140 MMOL/L (ref 136–145)
TRIGL SERPL-MCNC: 175 MG/DL (ref 0–150)
VLDLC SERPL-MCNC: 35 MG/DL (ref 5–40)
WBC NRBC COR # BLD: 7.5 10*3/MM3 (ref 4.5–10)

## 2018-11-28 PROCEDURE — 80053 COMPREHEN METABOLIC PANEL: CPT | Performed by: FAMILY MEDICINE

## 2018-11-28 PROCEDURE — 80061 LIPID PANEL: CPT | Performed by: FAMILY MEDICINE

## 2018-11-28 PROCEDURE — 36415 COLL VENOUS BLD VENIPUNCTURE: CPT | Performed by: FAMILY MEDICINE

## 2018-11-28 PROCEDURE — 85025 COMPLETE CBC W/AUTO DIFF WBC: CPT | Performed by: FAMILY MEDICINE

## 2018-11-28 PROCEDURE — 99214 OFFICE O/P EST MOD 30 MIN: CPT | Performed by: FAMILY MEDICINE

## 2018-11-28 PROCEDURE — G0439 PPPS, SUBSEQ VISIT: HCPCS | Performed by: FAMILY MEDICINE

## 2018-11-28 NOTE — PROGRESS NOTES
SUBJECTIVE:  The patient is an 88-year-old white male who is here for follow-up.  He has a history of arrhythmia, depression, and hyperlipidemia.  He's doing well without complaints.    PAST MEDICAL HISTORY:  Reviewed.    REVIEW OF SYSTEMS:  Please see above; 14 point ROS negative.      OBJECTIVE: Vitals signs are reviewed and are stable.    HEENT: PERRLA.   Neck:  Supple.   Lungs:  Clear.    Heart:  Regular rate and rhythm.  With occasional PVC beat  Abdomen:   Soft, nontender.   Extremities:  No cyanosis, clubbing or edema.     ASSESSMENT:    Hyperlipidemia  History of depression  Chronic PVCs  PLAN: CMP fasting lipid are ordered.  Patient will follow up on labs.  He will call for any problems.    Dictated utilizing Dragon dictation.

## 2018-11-28 NOTE — PROGRESS NOTES
QUICK REFERENCE INFORMATION:  The ABCs of the Annual Wellness Visit    Subsequent Medicare Wellness Visit    HEALTH RISK ASSESSMENT    8/20/1930    Recent Hospitalizations:  No hospitalization(s) within the last year..        Current Medical Providers:  Patient Care Team:  Kvng Hogue MD as PCP - General (Family Medicine)  Arthur Obrien MD as PCP - Claims Attributed  Sam Maravilla MD as Consulting Physician (Urology)  Martin Betancourt MD as Consulting Physician (Dermatology)  Wicho Andrade DC (Chiropractic Medicine)        Smoking Status:  Social History     Tobacco Use   Smoking Status Never Smoker       Alcohol Consumption:  Social History     Substance and Sexual Activity   Alcohol Use No       Depression Screen:   PHQ-2/PHQ-9 Depression Screening 11/28/2018   Little interest or pleasure in doing things 0   Feeling down, depressed, or hopeless 0   Trouble falling or staying asleep, or sleeping too much 1   Feeling tired or having little energy 0   Poor appetite or overeating 0   Feeling bad about yourself - or that you are a failure or have let yourself or your family down 0   Trouble concentrating on things, such as reading the newspaper or watching television 0   Moving or speaking so slowly that other people could have noticed. Or the opposite - being so fidgety or restless that you have been moving around a lot more than usual 0   Thoughts that you would be better off dead, or of hurting yourself in some way 0   Total Score 1   If you checked off any problems, how difficult have these problems made it for you to do your work, take care of things at home, or get along with other people? Somewhat difficult       Health Habits and Functional and Cognitive Screening:  Functional & Cognitive Status 11/28/2018   Do you have difficulty preparing food and eating? No   Do you have difficulty bathing yourself, getting dressed or grooming yourself? No   Do you have difficulty using the toilet? No   Do you  have difficulty moving around from place to place? Yes   Do you have trouble with steps or getting out of a bed or a chair? No   In the past year have you fallen or experienced a near fall? No   Current Diet Well Balanced Diet   Dental Exam Up to date   Eye Exam Not up to date   Exercise (times per week) 4 times per week   Current Exercise Activities Include Bicycling Outdoors   Do you need help using the phone?  No   Are you deaf or do you have serious difficulty hearing?  No   Do you need help with transportation? No   Do you need help shopping? No   Do you need help preparing meals?  No   Do you need help with housework?  No   Do you need help with laundry? No   Do you need help taking your medications? No   Do you need help managing money? No   Do you ever drive or ride in a car without wearing a seat belt? No   Have you felt unusual stress, anger or loneliness in the last month? No   Who do you live with? Spouse   If you need help, do you have trouble finding someone available to you? No   Have you been bothered in the last four weeks by sexual problems? No   Do you have difficulty concentrating, remembering or making decisions? No           Does the patient have evidence of cognitive impairment? No    Aspirin use counseling: Taking ASA appropriately as indicated      Recent Lab Results:  CMP:  Lab Results   Component Value Date    BUN 17 05/22/2017    CREATININE 1.10 06/28/2017    EGFRIFNONA 59 (L) 05/22/2017    BCR 14.4 05/22/2017     05/22/2017    K 4.4 05/22/2017    CO2 26.0 05/22/2017    CALCIUM 9.4 05/22/2017    ALBUMIN 4.10 05/22/2017    BILITOT 0.6 05/22/2017    ALKPHOS 62 05/22/2017    AST 28 05/22/2017    ALT 15 05/22/2017     Lipid Panel:  Lab Results   Component Value Date    CHOL 120 05/22/2017    TRIG 127 05/22/2017    HDL 44 05/22/2017    VLDL 25.4 05/22/2017    LDLHDL 1.15 05/22/2017     HbA1c:       Visual Acuity:  No exam data present    Age-appropriate Screening Schedule:  Refer to the  list below for future screening recommendations based on patient's age, sex and/or medical conditions. Orders for these recommended tests are listed in the plan section. The patient has been provided with a written plan.    Health Maintenance   Topic Date Due   • TDAP/TD VACCINES (1 - Tdap) 08/20/1949   • ZOSTER VACCINE (1 of 2) 08/20/1980   • LIPID PANEL  05/22/2018   • INFLUENZA VACCINE  08/01/2018   • PNEUMOCOCCAL VACCINES (65+ LOW/MEDIUM RISK)  Completed        Subjective   History of Present Illness    Enrique Gillette is a 88 y.o. male who presents for an Subsequent Wellness Visit.    The following portions of the patient's history were reviewed and updated as appropriate: current medications, past family history and past surgical history.    Outpatient Medications Prior to Visit   Medication Sig Dispense Refill   • acetaminophen (TYLENOL) 500 MG tablet Take 500 mg by mouth every 6 (six) hours as needed for mild pain (1-3).     • aspirin 81 MG EC tablet Take 81 mg by mouth daily.     • diphenhydrAMINE-acetaminophen (TYLENOL PM)  MG tablet per tablet Take 1 tablet by mouth At Night As Needed for Sleep.     • PARoxetine (PAXIL) 10 MG tablet Take 1 tablet by mouth Every Morning. 90 tablet 3   • rosuvastatin (CRESTOR) 10 MG tablet TAKE 1 TABLET DAILY 90 tablet 0   • meclizine (ANTIVERT) 12.5 MG tablet Take 1 tablet by mouth 3 (Three) Times a Day As Needed for dizziness. 30 tablet 2     No facility-administered medications prior to visit.        Patient Active Problem List   Diagnosis   • Stenosis, spinal, lumbar   • Arthritis of right knee       Advance Care Planning:  has an advance directive - a copy HAS NOT been provided    Identification of Risk Factors:  Risk factors include: increased fall risk.    Review of Systems    Compared to one year ago, the patient feels his physical health is the same.  Compared to one year ago, the patient feels his mental health is the same.    Objective     Physical  "Exam    Vitals:    11/28/18 1239   BP: 120/64   BP Location: Left arm   Patient Position: Sitting   Pulse: 70   Resp: 18   Temp: 98 °F (36.7 °C)   TempSrc: Oral   SpO2: 96%   Weight: 86.2 kg (190 lb)   Height: 172.7 cm (68\")   PainSc: 0-No pain       Patient's Body mass index is 28.89 kg/m². BMI is within normal parameters. No follow-up required.      Assessment/Plan   Patient Self-Management and Personalized Health Advice  The patient has been provided with information about: fall prevention and preventive services including:   · Fall Risk assessment done.    Visit Diagnoses:    ICD-10-CM ICD-9-CM   1. Medicare annual wellness visit, subsequent Z00.00 V70.0   2. Mixed hyperlipidemia E78.2 272.2       Orders Placed This Encounter   Procedures   • Comprehensive Metabolic Panel   • Lipid Panel   • CBC Auto Differential   • CBC & Differential     Order Specific Question:   Manual Differential     Answer:   No       Outpatient Encounter Medications as of 11/28/2018   Medication Sig Dispense Refill   • acetaminophen (TYLENOL) 500 MG tablet Take 500 mg by mouth every 6 (six) hours as needed for mild pain (1-3).     • aspirin 81 MG EC tablet Take 81 mg by mouth daily.     • diphenhydrAMINE-acetaminophen (TYLENOL PM)  MG tablet per tablet Take 1 tablet by mouth At Night As Needed for Sleep.     • PARoxetine (PAXIL) 10 MG tablet Take 1 tablet by mouth Every Morning. 90 tablet 3   • rosuvastatin (CRESTOR) 10 MG tablet TAKE 1 TABLET DAILY 90 tablet 0   • meclizine (ANTIVERT) 12.5 MG tablet Take 1 tablet by mouth 3 (Three) Times a Day As Needed for dizziness. 30 tablet 2     No facility-administered encounter medications on file as of 11/28/2018.        Reviewed use of high risk medication in the elderly: yes  Reviewed for potential of harmful drug interactions in the elderly: yes    Follow Up:  No Follow-up on file.     An After Visit Summary and PPPS with all of these plans were given to the patient.         "

## 2018-11-28 NOTE — PATIENT INSTRUCTIONS
Medicare Wellness  Personal Prevention Plan of Service     Date of Office Visit:  2018  Encounter Provider:  Kvng Hogue MD  Place of Service:  [facility]  Patient Name: Enrique Gillette  :  1930    As part of the Medicare Wellness portion of your visit today, we are providing you with this personalized preventive plan of services (PPPS). This plan is based upon recommendations of the [place] Preventive Services Task Force (USPSTF) and the Advisory Committee on Immunization Practices (ACIP).    This lists the preventive care services that should be considered, and provides dates of when you are due. Items listed as completed are up-to-date and do not require any further intervention.    Health Maintenance   Topic Date Due   • TDAP/TD VACCINES (1 - Tdap) 1949   • ZOSTER VACCINE (1 of 2) 1980   • MEDICARE ANNUAL WELLNESS  2018   • LIPID PANEL  2018   • INFLUENZA VACCINE  2018   • PNEUMOCOCCAL VACCINES (65+ LOW/MEDIUM RISK)  Completed       Orders Placed This Encounter   Procedures   • Comprehensive Metabolic Panel   • Lipid Panel   • CBC Auto Differential   • CBC & Differential     Order Specific Question:   Manual Differential     Answer:   No       No Follow-up on file.

## 2018-12-07 RX ORDER — ROSUVASTATIN CALCIUM 10 MG/1
TABLET, COATED ORAL
Qty: 90 TABLET | Refills: 0 | Status: SHIPPED | OUTPATIENT
Start: 2018-12-07 | End: 2019-03-07 | Stop reason: SDUPTHER

## 2019-03-07 RX ORDER — ROSUVASTATIN CALCIUM 10 MG/1
TABLET, COATED ORAL
Qty: 90 TABLET | Refills: 0 | Status: SHIPPED | OUTPATIENT
Start: 2019-03-07 | End: 2019-06-05 | Stop reason: SDUPTHER

## 2019-06-05 RX ORDER — ROSUVASTATIN CALCIUM 10 MG/1
TABLET, COATED ORAL
Qty: 90 TABLET | Refills: 0 | Status: SHIPPED | OUTPATIENT
Start: 2019-06-05 | End: 2019-09-03 | Stop reason: SDUPTHER

## 2019-06-24 RX ORDER — PAROXETINE 10 MG/1
TABLET, FILM COATED ORAL
Qty: 90 TABLET | Refills: 3 | Status: SHIPPED | OUTPATIENT
Start: 2019-06-24 | End: 2021-10-15

## 2019-09-03 RX ORDER — ROSUVASTATIN CALCIUM 10 MG/1
TABLET, COATED ORAL
Qty: 90 TABLET | Refills: 4 | Status: SHIPPED | OUTPATIENT
Start: 2019-09-03 | End: 2020-11-26

## 2019-10-17 ENCOUNTER — OFFICE VISIT (OUTPATIENT)
Dept: FAMILY MEDICINE CLINIC | Facility: CLINIC | Age: 84
End: 2019-10-17

## 2019-10-17 VITALS
OXYGEN SATURATION: 95 % | DIASTOLIC BLOOD PRESSURE: 72 MMHG | TEMPERATURE: 97.9 F | BODY MASS INDEX: 28.92 KG/M2 | HEART RATE: 59 BPM | SYSTOLIC BLOOD PRESSURE: 130 MMHG | HEIGHT: 68 IN | WEIGHT: 190.8 LBS

## 2019-10-17 DIAGNOSIS — E78.2 MIXED HYPERLIPIDEMIA: Primary | ICD-10-CM

## 2019-10-17 DIAGNOSIS — M19.90 ARTHRITIS: ICD-10-CM

## 2019-10-17 LAB
ALBUMIN SERPL-MCNC: 4.3 G/DL (ref 3.5–5.2)
ALBUMIN/GLOB SERPL: 1.7 G/DL
ALP SERPL-CCNC: 63 U/L (ref 39–117)
ALT SERPL W P-5'-P-CCNC: 13 U/L (ref 1–41)
ANION GAP SERPL CALCULATED.3IONS-SCNC: 12.8 MMOL/L (ref 5–15)
AST SERPL-CCNC: 24 U/L (ref 1–40)
BACTERIA UR QL AUTO: NORMAL /HPF
BASOPHILS # BLD AUTO: 0.04 10*3/MM3 (ref 0–0.2)
BASOPHILS NFR BLD AUTO: 0.5 % (ref 0–1.5)
BILIRUB SERPL-MCNC: 0.5 MG/DL (ref 0.2–1.2)
BILIRUB UR QL STRIP: NEGATIVE
BUN BLD-MCNC: 18 MG/DL (ref 8–23)
BUN/CREAT SERPL: 14.4 (ref 7–25)
CALCIUM SPEC-SCNC: 9.1 MG/DL (ref 8.6–10.5)
CHLORIDE SERPL-SCNC: 100 MMOL/L (ref 98–107)
CHOLEST SERPL-MCNC: 121 MG/DL (ref 0–200)
CLARITY UR: CLEAR
CO2 SERPL-SCNC: 26.2 MMOL/L (ref 22–29)
COLOR UR: YELLOW
CREAT BLD-MCNC: 1.25 MG/DL (ref 0.76–1.27)
DEPRECATED RDW RBC AUTO: 43.1 FL (ref 37–54)
EOSINOPHIL # BLD AUTO: 0.63 10*3/MM3 (ref 0–0.4)
EOSINOPHIL NFR BLD AUTO: 8.1 % (ref 0.3–6.2)
ERYTHROCYTE [DISTWIDTH] IN BLOOD BY AUTOMATED COUNT: 12.9 % (ref 12.3–15.4)
GFR SERPL CREATININE-BSD FRML MDRD: 54 ML/MIN/1.73
GLOBULIN UR ELPH-MCNC: 2.6 GM/DL
GLUCOSE BLD-MCNC: 82 MG/DL (ref 65–99)
GLUCOSE UR STRIP-MCNC: NEGATIVE MG/DL
HCT VFR BLD AUTO: 43.2 % (ref 37.5–51)
HDLC SERPL-MCNC: 39 MG/DL (ref 40–60)
HGB BLD-MCNC: 14 G/DL (ref 13–17.7)
HGB UR QL STRIP.AUTO: NEGATIVE
IMM GRANULOCYTES # BLD AUTO: 0.01 10*3/MM3 (ref 0–0.05)
IMM GRANULOCYTES NFR BLD AUTO: 0.1 % (ref 0–0.5)
KETONES UR QL STRIP: NEGATIVE
LDLC SERPL CALC-MCNC: 53 MG/DL (ref 0–100)
LDLC/HDLC SERPL: 1.35 {RATIO}
LEUKOCYTE ESTERASE UR QL STRIP.AUTO: NEGATIVE
LYMPHOCYTES # BLD AUTO: 2.16 10*3/MM3 (ref 0.7–3.1)
LYMPHOCYTES NFR BLD AUTO: 27.6 % (ref 19.6–45.3)
MCH RBC QN AUTO: 29 PG (ref 26.6–33)
MCHC RBC AUTO-ENTMCNC: 32.4 G/DL (ref 31.5–35.7)
MCV RBC AUTO: 89.6 FL (ref 79–97)
MONOCYTES # BLD AUTO: 0.93 10*3/MM3 (ref 0.1–0.9)
MONOCYTES NFR BLD AUTO: 11.9 % (ref 5–12)
NEUTROPHILS # BLD AUTO: 4.05 10*3/MM3 (ref 1.7–7)
NEUTROPHILS NFR BLD AUTO: 51.8 % (ref 42.7–76)
NITRITE UR QL STRIP: NEGATIVE
NRBC BLD AUTO-RTO: 0 /100 WBC (ref 0–0.2)
PH UR STRIP.AUTO: 6.5 [PH] (ref 4.6–8)
PLATELET # BLD AUTO: 191 10*3/MM3 (ref 140–450)
PMV BLD AUTO: 10.9 FL (ref 6–12)
POTASSIUM BLD-SCNC: 4.5 MMOL/L (ref 3.5–5.2)
PROT SERPL-MCNC: 6.9 G/DL (ref 6–8.5)
PROT UR QL STRIP: NEGATIVE
RBC # BLD AUTO: 4.82 10*6/MM3 (ref 4.14–5.8)
RBC # UR: NORMAL /HPF
REF LAB TEST METHOD: NORMAL
SODIUM BLD-SCNC: 139 MMOL/L (ref 136–145)
SP GR UR STRIP: 1.01 (ref 1–1.03)
SQUAMOUS #/AREA URNS HPF: NORMAL /HPF
TRIGL SERPL-MCNC: 146 MG/DL (ref 0–150)
UROBILINOGEN UR QL STRIP: NORMAL
VLDLC SERPL-MCNC: 29.2 MG/DL (ref 5–40)
WBC NRBC COR # BLD: 7.82 10*3/MM3 (ref 3.4–10.8)
WBC UR QL AUTO: NORMAL /HPF

## 2019-10-17 PROCEDURE — 80053 COMPREHEN METABOLIC PANEL: CPT | Performed by: FAMILY MEDICINE

## 2019-10-17 PROCEDURE — 36415 COLL VENOUS BLD VENIPUNCTURE: CPT | Performed by: FAMILY MEDICINE

## 2019-10-17 PROCEDURE — 85025 COMPLETE CBC W/AUTO DIFF WBC: CPT | Performed by: FAMILY MEDICINE

## 2019-10-17 PROCEDURE — 81001 URINALYSIS AUTO W/SCOPE: CPT | Performed by: FAMILY MEDICINE

## 2019-10-17 PROCEDURE — 80061 LIPID PANEL: CPT | Performed by: FAMILY MEDICINE

## 2019-10-17 PROCEDURE — 99213 OFFICE O/P EST LOW 20 MIN: CPT | Performed by: FAMILY MEDICINE

## 2020-06-18 ENCOUNTER — OFFICE VISIT (OUTPATIENT)
Dept: FAMILY MEDICINE CLINIC | Facility: CLINIC | Age: 85
End: 2020-06-18

## 2020-06-18 VITALS
HEART RATE: 61 BPM | RESPIRATION RATE: 20 BRPM | WEIGHT: 189 LBS | SYSTOLIC BLOOD PRESSURE: 100 MMHG | OXYGEN SATURATION: 97 % | DIASTOLIC BLOOD PRESSURE: 60 MMHG | HEIGHT: 68 IN | BODY MASS INDEX: 28.64 KG/M2 | TEMPERATURE: 97.5 F

## 2020-06-18 DIAGNOSIS — E78.5 HYPERLIPIDEMIA, UNSPECIFIED HYPERLIPIDEMIA TYPE: Primary | ICD-10-CM

## 2020-06-18 DIAGNOSIS — Z00.00 MEDICARE ANNUAL WELLNESS VISIT, SUBSEQUENT: ICD-10-CM

## 2020-06-18 LAB
ALBUMIN SERPL-MCNC: 4.1 G/DL (ref 3.5–5.2)
ALBUMIN/GLOB SERPL: 1.4 G/DL
ALP SERPL-CCNC: 57 U/L (ref 39–117)
ALT SERPL W P-5'-P-CCNC: 14 U/L (ref 1–41)
ANION GAP SERPL CALCULATED.3IONS-SCNC: 10.1 MMOL/L (ref 5–15)
AST SERPL-CCNC: 25 U/L (ref 1–40)
BACTERIA UR QL AUTO: NORMAL /HPF
BILIRUB SERPL-MCNC: 0.7 MG/DL (ref 0.2–1.2)
BILIRUB UR QL STRIP: NEGATIVE
BUN BLD-MCNC: 17 MG/DL (ref 8–23)
BUN/CREAT SERPL: 14 (ref 7–25)
CALCIUM SPEC-SCNC: 9.6 MG/DL (ref 8.6–10.5)
CHLORIDE SERPL-SCNC: 104 MMOL/L (ref 98–107)
CHOLEST SERPL-MCNC: 107 MG/DL (ref 0–200)
CLARITY UR: CLEAR
CO2 SERPL-SCNC: 25.9 MMOL/L (ref 22–29)
COLOR UR: YELLOW
CREAT BLD-MCNC: 1.21 MG/DL (ref 0.76–1.27)
ERYTHROCYTE [DISTWIDTH] IN BLOOD BY AUTOMATED COUNT: 14.1 % (ref 12.3–15.4)
GFR SERPL CREATININE-BSD FRML MDRD: 56 ML/MIN/1.73
GLOBULIN UR ELPH-MCNC: 3 GM/DL
GLUCOSE BLD-MCNC: 96 MG/DL (ref 65–99)
GLUCOSE UR STRIP-MCNC: NEGATIVE MG/DL
HCT VFR BLD AUTO: 41.9 % (ref 37.5–51)
HDLC SERPL-MCNC: 36 MG/DL (ref 40–60)
HGB BLD-MCNC: 14.4 G/DL (ref 13–17.7)
HGB UR QL STRIP.AUTO: NEGATIVE
KETONES UR QL STRIP: NEGATIVE
LDLC SERPL CALC-MCNC: 42 MG/DL (ref 0–100)
LDLC/HDLC SERPL: 1.18 {RATIO}
LEUKOCYTE ESTERASE UR QL STRIP.AUTO: NEGATIVE
LYMPHOCYTES # BLD AUTO: 2.1 10*3/MM3 (ref 0.7–3.1)
LYMPHOCYTES NFR BLD AUTO: 31.8 % (ref 19.6–45.3)
MCH RBC QN AUTO: 30.4 PG (ref 26.6–33)
MCHC RBC AUTO-ENTMCNC: 34.4 G/DL (ref 31.5–35.7)
MCV RBC AUTO: 88.3 FL (ref 79–97)
MONOCYTES # BLD AUTO: 0.6 10*3/MM3 (ref 0.1–0.9)
MONOCYTES NFR BLD AUTO: 8.8 % (ref 5–12)
NEUTROPHILS # BLD AUTO: 4 10*3/MM3 (ref 1.7–7)
NEUTROPHILS NFR BLD AUTO: 59.4 % (ref 42.7–76)
NITRITE UR QL STRIP: NEGATIVE
PH UR STRIP.AUTO: 6.5 [PH] (ref 4.6–8)
PLATELET # BLD AUTO: 165 10*3/MM3 (ref 140–450)
PMV BLD AUTO: 8.4 FL (ref 6–12)
POTASSIUM BLD-SCNC: 4.3 MMOL/L (ref 3.5–5.2)
PROT SERPL-MCNC: 7.1 G/DL (ref 6–8.5)
PROT UR QL STRIP: NEGATIVE
RBC # BLD AUTO: 4.74 10*6/MM3 (ref 4.14–5.8)
RBC # UR: NORMAL /HPF
REF LAB TEST METHOD: NORMAL
SODIUM BLD-SCNC: 140 MMOL/L (ref 136–145)
SP GR UR STRIP: 1.02 (ref 1–1.03)
SQUAMOUS #/AREA URNS HPF: NORMAL /HPF
TRIGL SERPL-MCNC: 143 MG/DL (ref 0–150)
UROBILINOGEN UR QL STRIP: NORMAL
VLDLC SERPL-MCNC: 28.6 MG/DL (ref 5–40)
WBC NRBC COR # BLD: 6.7 10*3/MM3 (ref 3.4–10.8)
WBC UR QL AUTO: NORMAL /HPF

## 2020-06-18 PROCEDURE — 80061 LIPID PANEL: CPT | Performed by: FAMILY MEDICINE

## 2020-06-18 PROCEDURE — 81001 URINALYSIS AUTO W/SCOPE: CPT | Performed by: FAMILY MEDICINE

## 2020-06-18 PROCEDURE — 80053 COMPREHEN METABOLIC PANEL: CPT | Performed by: FAMILY MEDICINE

## 2020-06-18 PROCEDURE — 85025 COMPLETE CBC W/AUTO DIFF WBC: CPT | Performed by: FAMILY MEDICINE

## 2020-06-18 PROCEDURE — 36415 COLL VENOUS BLD VENIPUNCTURE: CPT | Performed by: FAMILY MEDICINE

## 2020-06-18 PROCEDURE — G0439 PPPS, SUBSEQ VISIT: HCPCS | Performed by: FAMILY MEDICINE

## 2020-06-18 NOTE — PROGRESS NOTES
The ABCs of the Annual Wellness Visit  Subsequent Medicare Wellness Visit    Chief Complaint   Patient presents with   • Medicare Wellness-subsequent       Subjective   History of Present Illness:  Enrique Gillette is a 89 y.o. male who presents for a Subsequent Medicare Wellness Visit.    HEALTH RISK ASSESSMENT    Recent Hospitalizations:  No hospitalization(s) within the last year.    Current Medical Providers:  Patient Care Team:  Kvng Hogue MD as PCP - General (Family Medicine)  Sam Maravilla MD as Consulting Physician (Urology)  Martin Betancourt MD as Consulting Physician (Dermatology)  Wicho Andrade DC (Chiropractic Medicine)    Smoking Status:  Social History     Tobacco Use   Smoking Status Never Smoker       Alcohol Consumption:  Social History     Substance and Sexual Activity   Alcohol Use No       Depression Screen:   PHQ-2/PHQ-9 Depression Screening 6/18/2020   Little interest or pleasure in doing things 0   Feeling down, depressed, or hopeless 0   Trouble falling or staying asleep, or sleeping too much 1   Feeling tired or having little energy 0   Poor appetite or overeating 0   Feeling bad about yourself - or that you are a failure or have let yourself or your family down 0   Trouble concentrating on things, such as reading the newspaper or watching television 0   Moving or speaking so slowly that other people could have noticed. Or the opposite - being so fidgety or restless that you have been moving around a lot more than usual 0   Thoughts that you would be better off dead, or of hurting yourself in some way 0   Total Score 1   If you checked off any problems, how difficult have these problems made it for you to do your work, take care of things at home, or get along with other people? Not difficult at all       Fall Risk Screen:  STEADI Fall Risk Assessment was completed, and patient is at MODERATE risk for falls. Assessment completed on:6/18/2020    Health Habits and Functional and  Cognitive Screening:  Functional & Cognitive Status 6/18/2020   Do you have difficulty preparing food and eating? No   Do you have difficulty bathing yourself, getting dressed or grooming yourself? No   Do you have difficulty using the toilet? No   Do you have difficulty moving around from place to place? No   Do you have trouble with steps or getting out of a bed or a chair? Yes   Current Diet Well Balanced Diet   Dental Exam Up to date   Eye Exam Not up to date   Exercise (times per week) 1 times per week   Current Exercise Activities Include Walking   Do you need help using the phone?  No   Are you deaf or do you have serious difficulty hearing?  No   Do you need help with transportation? No   Do you need help shopping? No   Do you need help preparing meals?  No   Do you need help with housework?  No   Do you need help with laundry? No   Do you need help taking your medications? No   Do you need help managing money? No   Do you ever drive or ride in a car without wearing a seat belt? No   Have you felt unusual stress, anger or loneliness in the last month? No   Who do you live with? Spouse   If you need help, do you have trouble finding someone available to you? No   Have you been bothered in the last four weeks by sexual problems? No   Do you have difficulty concentrating, remembering or making decisions? No         Does the patient have evidence of cognitive impairment? NO    Asprin use counseling:Taking ASA appropriately as indicated    Age-appropriate Screening Schedule:  Refer to the list below for future screening recommendations based on patient's age, sex and/or medical conditions. Orders for these recommended tests are listed in the plan section. The patient has been provided with a written plan.    Health Maintenance   Topic Date Due   • TDAP/TD VACCINES (1 - Tdap) 08[birth year]/1941   • ZOSTER VACCINE (1 of 2) 08/20/1980   • INFLUENZA VACCINE  08/01/2020   • LIPID PANEL  10/17/2020          The following  "portions of the patient's history were reviewed and updated as appropriate: past family history and past medical history.    Outpatient Medications Prior to Visit   Medication Sig Dispense Refill   • acetaminophen (TYLENOL) 500 MG tablet Take 500 mg by mouth every 6 (six) hours as needed for mild pain (1-3).     • aspirin 81 MG EC tablet Take 81 mg by mouth daily.     • diphenhydrAMINE-acetaminophen (TYLENOL PM)  MG tablet per tablet Take 1 tablet by mouth At Night As Needed for Sleep.     • NON FORMULARY Omega 3 joint pain     • rosuvastatin (CRESTOR) 10 MG tablet TAKE 1 TABLET DAILY 90 tablet 4   • meclizine (ANTIVERT) 12.5 MG tablet Take 1 tablet by mouth 3 (Three) Times a Day As Needed for dizziness. 30 tablet 2   • PARoxetine (PAXIL) 10 MG tablet TAKE 1 TABLET EVERY MORNING 90 tablet 3     No facility-administered medications prior to visit.        Patient Active Problem List   Diagnosis   • Stenosis, spinal, lumbar   • Arthritis of right knee       Advanced Care Planning:  ACP discussion was held with the patient during this visit. Patient has an advance directive in EMR which is still valid.     Review of Systems    Compared to one year ago, the patient feels his physical health is better.  Compared to one year ago, the patient feels his mental health is better.    Reviewed chart for potential of high risk medication in the elderly: yes  Reviewed chart for potential of harmful drug interactions in the elderly:yes    Objective         Vitals:    06/18/20 0838   BP: 100/60   BP Location: Left arm   Patient Position: Sitting   Pulse: 61   Resp: 20   Temp: 97.5 °F (36.4 °C)   TempSrc: Infrared   SpO2: 97%   Weight: 85.7 kg (189 lb)   Height: 172.7 cm (68\")       Body mass index is 28.74 kg/m².  Discussed the patient's BMI with him. The BMI is above average; no BMI management plan is appropriate..    Physical Exam          Assessment/Plan   Medicare Risks and Personalized Health Plan  CMS Preventative " Services Quick Reference  Advance Directive Discussion  Fall Risk  Immunizations Discussed/Encouraged (specific immunizations; Influenza )    The above risks/problems have been discussed with the patient.  Pertinent information has been shared with the patient in the After Visit Summary.  Follow up plans and orders are seen below in the Assessment/Plan Section.    Diagnoses and all orders for this visit:    1. Hyperlipidemia, unspecified hyperlipidemia type (Primary)  -     CBC & Differential  -     Comprehensive Metabolic Panel  -     Lipid Panel  -     Urinalysis With Microscopic - Urine, Clean Catch  -     CBC Auto Differential  -     Urinalysis without microscopic (no culture) - Urine, Clean Catch  -     Urinalysis, Microscopic Only - Urine, Clean Catch    2. Medicare annual wellness visit, subsequent  -     CBC & Differential  -     Comprehensive Metabolic Panel  -     Lipid Panel  -     Urinalysis With Microscopic - Urine, Clean Catch  -     CBC Auto Differential  -     Urinalysis without microscopic (no culture) - Urine, Clean Catch  -     Urinalysis, Microscopic Only - Urine, Clean Catch      Follow Up:  No follow-ups on file.     An After Visit Summary and PPPS were given to the patient.

## 2020-06-18 NOTE — PROGRESS NOTES
SUBJECTIVE:  The patient is 89-year-old white male.  He has hyperlipidemia and pression.  He has chronic lumbar pain from degenerative disc disease.  He experiences leg weakness from this but is getting used to it.  He ambulates with a cane.    PAST MEDICAL HISTORY:  Reviewed.    REVIEW OF SYSTEMS:  Please see above; all others reviewed and are negative.      OBJECTIVE:   Vitals signs are reviewed and are stable.    General:  Well-nourished.  Alert and oriented x3 in no acute distress.  HEENT: PERRLA.   Neck:  Supple.   Lungs:  Clear.    Heart:  Regular rate and rhythm.   Abdomen:   Soft, nontender.   Extremities:  No cyanosis, clubbing or edema.   Neurological:  Grossly intact without motor or sensory deficits.     ASSESSMENT:    Medicare wellness  Hyperlipidemia  DDD  PLAN: CBC, CMP fasting lipid ordered.  Healthy lifestyle discussed.  Follow-up on labs.  Call if problems.    Dictated utilizing Dragon dictation.

## 2020-06-18 NOTE — PATIENT INSTRUCTIONS
Medicare Wellness  Personal Prevention Plan of Service     Date of Office Visit:  2020  Encounter Provider:  Kvng Hogue MD  Place of Service:  [facility]  Patient Name: Enrique Gillette  :  1930    As part of the Medicare Wellness portion of your visit today, we are providing you with this personalized preventive plan of services (PPPS). This plan is based upon recommendations of the [place] Preventive Services Task Force (USPSTF) and the Advisory Committee on Immunization Practices (ACIP).    This lists the preventive care services that should be considered, and provides dates of when you are due. Items listed as completed are up-to-date and do not require any further intervention.    Health Maintenance   Topic Date Due   • TDAP/TD VACCINES (1 - Tdap) 1941   • ZOSTER VACCINE (1 of 2) 1980   • Pneumococcal Vaccine Once at 65 Years Old  1995   • MEDICARE ANNUAL WELLNESS  2019   • INFLUENZA VACCINE  2020   • LIPID PANEL  10/17/2020       Orders Placed This Encounter   Procedures   • Comprehensive Metabolic Panel   • Lipid Panel   • CBC Auto Differential   • Urinalysis without microscopic (no culture) - Urine, Clean Catch   • Urinalysis, Microscopic Only - Urine, Clean Catch   • CBC & Differential     Order Specific Question:   Manual Differential     Answer:   No   • Urinalysis With Microscopic - Urine, Clean Catch       No follow-ups on file.

## 2020-10-26 ENCOUNTER — TELEPHONE (OUTPATIENT)
Dept: FAMILY MEDICINE CLINIC | Facility: CLINIC | Age: 85
End: 2020-10-26

## 2020-10-26 NOTE — TELEPHONE ENCOUNTER
PATIENT CALLED AND STATES HE WAS SEEN TODAY AT [facility] FOR STOMACH AND BACK ISSUES.    RECORDS ARE COMING OVER AND PATIENT WANTED TO MAKE SURE DR. MARION SEES IT.   PATIENT THINKS HE MAY HAVE A KIDNEY STONE.     HE WAS GIVEN A SHOT BUT HE DOESN'T KNOW WHAT IT WAS. LABS WERE DONE.     PLEASE CALL AND ADVISE [phone]

## 2020-10-27 NOTE — TELEPHONE ENCOUNTER
PATIENT CALLED TODAY AND STATED THAT ROBISON WAS GOING TO SEND THE RESULTS OF THE TEST.    PATIENT REQUESTING IF ROBISON HAS SENT OVER THE INFORMATION TO BE REVIEWED.    PLEASE ADVISE  [phone]

## 2020-10-27 NOTE — TELEPHONE ENCOUNTER
I reviewed the visit and the labs.  It seems everything is appropriate.  If he feels okay now then no need for follow-up.  If things would worsen he should call.

## 2020-11-26 RX ORDER — ROSUVASTATIN CALCIUM 10 MG/1
TABLET, COATED ORAL
Qty: 90 TABLET | Refills: 3 | Status: SHIPPED | OUTPATIENT
Start: 2020-11-26 | End: 2021-10-29

## 2021-01-19 ENCOUNTER — IMMUNIZATION (OUTPATIENT)
Dept: VACCINE CLINIC | Facility: HOSPITAL | Age: 86
End: 2021-01-19

## 2021-01-19 PROCEDURE — 91300 HC SARSCOV02 VAC 30MCG/0.3ML IM: CPT | Performed by: INTERNAL MEDICINE

## 2021-01-19 PROCEDURE — 0001A: CPT | Performed by: INTERNAL MEDICINE

## 2021-02-09 ENCOUNTER — IMMUNIZATION (OUTPATIENT)
Dept: VACCINE CLINIC | Facility: HOSPITAL | Age: 86
End: 2021-02-09

## 2021-02-09 PROCEDURE — 91300 HC SARSCOV02 VAC 30MCG/0.3ML IM: CPT | Performed by: INTERNAL MEDICINE

## 2021-02-09 PROCEDURE — 0002A: CPT | Performed by: INTERNAL MEDICINE

## 2021-04-20 ENCOUNTER — TELEPHONE (OUTPATIENT)
Dept: FAMILY MEDICINE CLINIC | Facility: CLINIC | Age: 86
End: 2021-04-20

## 2021-09-30 ENCOUNTER — TELEPHONE (OUTPATIENT)
Dept: FAMILY MEDICINE CLINIC | Facility: CLINIC | Age: 86
End: 2021-09-30

## 2021-09-30 NOTE — TELEPHONE ENCOUNTER
Caller: Enrique Gillette    Relationship: Self    Best call back number: [phone]     What is the best time to reach you: ANY    Who are you requesting to speak with (clinical staff, provider,  specific staff member): ANY    What was the call regarding: PATIENT IS RETURNING A CALL FROM THE OFFICE, STATED THAT HIS WIFE WHO HAS [employer] ANSWERED THE PHONE AND DOESN'T REMEMBER WHY THE OFFICE CALLED     Do you require a callback: YES

## 2021-10-15 ENCOUNTER — OFFICE VISIT (OUTPATIENT)
Dept: FAMILY MEDICINE CLINIC | Facility: CLINIC | Age: 86
End: 2021-10-15

## 2021-10-15 VITALS
SYSTOLIC BLOOD PRESSURE: 124 MMHG | DIASTOLIC BLOOD PRESSURE: 80 MMHG | HEIGHT: 68 IN | HEART RATE: 80 BPM | TEMPERATURE: 97.7 F | OXYGEN SATURATION: 98 % | WEIGHT: 188.2 LBS | BODY MASS INDEX: 28.52 KG/M2

## 2021-10-15 DIAGNOSIS — Z00.00 MEDICARE ANNUAL WELLNESS VISIT, SUBSEQUENT: Primary | ICD-10-CM

## 2021-10-15 DIAGNOSIS — M54.42 CHRONIC BILATERAL LOW BACK PAIN WITH BILATERAL SCIATICA: ICD-10-CM

## 2021-10-15 DIAGNOSIS — E78.5 HYPERLIPIDEMIA, UNSPECIFIED HYPERLIPIDEMIA TYPE: ICD-10-CM

## 2021-10-15 DIAGNOSIS — M54.41 CHRONIC BILATERAL LOW BACK PAIN WITH BILATERAL SCIATICA: ICD-10-CM

## 2021-10-15 DIAGNOSIS — G89.29 CHRONIC BILATERAL LOW BACK PAIN WITH BILATERAL SCIATICA: ICD-10-CM

## 2021-10-15 LAB
ALBUMIN SERPL-MCNC: 4.5 G/DL (ref 3.5–5.2)
ALBUMIN/GLOB SERPL: 1.7 G/DL
ALP SERPL-CCNC: 72 U/L (ref 39–117)
ALT SERPL W P-5'-P-CCNC: 16 U/L (ref 1–41)
ANION GAP SERPL CALCULATED.3IONS-SCNC: 13.2 MMOL/L (ref 5–15)
AST SERPL-CCNC: 31 U/L (ref 1–40)
BILIRUB SERPL-MCNC: 0.4 MG/DL (ref 0–1.2)
BUN SERPL-MCNC: 17 MG/DL (ref 8–23)
BUN/CREAT SERPL: 14.4 (ref 7–25)
CALCIUM SPEC-SCNC: 9.6 MG/DL (ref 8.2–9.6)
CHLORIDE SERPL-SCNC: 105 MMOL/L (ref 98–107)
CHOLEST SERPL-MCNC: 120 MG/DL (ref 0–200)
CO2 SERPL-SCNC: 24.8 MMOL/L (ref 22–29)
CREAT SERPL-MCNC: 1.18 MG/DL (ref 0.76–1.27)
GFR SERPL CREATININE-BSD FRML MDRD: 58 ML/MIN/1.73
GLOBULIN UR ELPH-MCNC: 2.7 GM/DL
GLUCOSE SERPL-MCNC: 100 MG/DL (ref 65–99)
HDLC SERPL-MCNC: 38 MG/DL (ref 40–60)
LDLC SERPL CALC-MCNC: 52 MG/DL (ref 0–100)
LDLC/HDLC SERPL: 1.21 {RATIO}
POTASSIUM SERPL-SCNC: 4.7 MMOL/L (ref 3.5–5.2)
PROT SERPL-MCNC: 7.2 G/DL (ref 6–8.5)
SODIUM SERPL-SCNC: 143 MMOL/L (ref 136–145)
TRIGL SERPL-MCNC: 181 MG/DL (ref 0–150)
VLDLC SERPL-MCNC: 30 MG/DL (ref 5–40)

## 2021-10-15 PROCEDURE — 1159F MED LIST DOCD IN RCRD: CPT | Performed by: NURSE PRACTITIONER

## 2021-10-15 PROCEDURE — 80061 LIPID PANEL: CPT | Performed by: NURSE PRACTITIONER

## 2021-10-15 PROCEDURE — 80053 COMPREHEN METABOLIC PANEL: CPT | Performed by: NURSE PRACTITIONER

## 2021-10-15 PROCEDURE — 36415 COLL VENOUS BLD VENIPUNCTURE: CPT | Performed by: NURSE PRACTITIONER

## 2021-10-15 PROCEDURE — G0439 PPPS, SUBSEQ VISIT: HCPCS | Performed by: NURSE PRACTITIONER

## 2021-10-15 PROCEDURE — 1170F FXNL STATUS ASSESSED: CPT | Performed by: NURSE PRACTITIONER

## 2021-10-15 PROCEDURE — 1126F AMNT PAIN NOTED NONE PRSNT: CPT | Performed by: NURSE PRACTITIONER

## 2021-10-15 NOTE — PATIENT INSTRUCTIONS
Medicare Wellness  Personal Prevention Plan of Service     Date of Office Visit:  10/15/2021  Encounter Provider:  JULIO Yuen  Place of Service:  [facility]  Patient Name: Enrique Gillette  :  1930    As part of the Medicare Wellness portion of your visit today, we are providing you with this personalized preventive plan of services (PPPS). This plan is based upon recommendations of the [place] Preventive Services Task Force (USPSTF) and the Advisory Committee on Immunization Practices (ACIP).    This lists the preventive care services that should be considered, and provides dates of when you are due. Items listed as completed are up-to-date and do not require any further intervention.    Health Maintenance   Topic Date Due   • TDAP/TD VACCINES (1 - Tdap) Never done   • ZOSTER VACCINE (1 of 2) Never done   • ANNUAL WELLNESS VISIT  2021   • LIPID PANEL  2021   • COVID-19 Vaccine  Completed   • INFLUENZA VACCINE  Completed   • Pneumococcal Vaccine 65+  Completed       No orders of the defined types were placed in this encounter.      No follow-ups on file.      Advance Care Planning and Advance Directives     You make decisions on a daily basis - decisions about where you want to live, your career, your home, your life. Perhaps one of the most important decisions you face is your choice for future medical care. Take time to talk with your family and your healthcare team and start planning today.  Advance Care Planning is a process that can help you:  · Understand possible future healthcare decisions in light of your own experiences  · Reflect on those decision in light of your goals and values  · Discuss your decisions with those closest to you and the healthcare professionals that care for you  · Make a plan by creating a document that reflects your wishes    Surrogate Decision Maker  In the event of a medical emergency, which has left you unable to  communicate or to make your own decisions, you would need someone to make decisions for you.  It is important to discuss your preferences for medical treatment with this person while you are in good health.     Qualities of a surrogate decision maker:  • Willing to take on this role and responsibility  • Knows what you want for future medical care  • Willing to follow your wishes even if they don't agree with them  • Able to make difficult medical decisions under stressful circumstances    Advance Directives  These are legal documents you can create that will guide your healthcare team and decision maker(s) when needed. These documents can be stored in the electronic medical record.    · Living Will - a legal document to guide your care if you have a terminal condition or a serious illness and are unable to communicate. States vary by statute in document names/types, but most forms may include one or more of the following:        -  Directions regarding life-prolonging treatments        -  Directions regarding artificially provided nutrition/hydration        -  Choosing a healthcare decision maker        -  Direction regarding organ/tissue donation    · Durable Power of  for Healthcare - this document names an -in-fact to make medical decisions for you, but it may also allow this person to make personal and financial decisions for you. Please seek the advice of an  if you need this type of document.    **Advance Directives are not required and no one may discriminate against you if you do not sign one.    Medical Orders  Many states allow specific forms/orders signed by your physician to record your wishes for medical treatment in your current state of health. This form, signed in personal communication with your physician, addresses resuscitation and other medical interventions that you may or may not want.      For more information or to schedule a time with a [facility] Advance Care  Planning Facilitator contact: [facility] or call [phone] and someone will contact you directly.      Medicare wellness today,   Refer to PT he will call for appt,   Labs today will call with results.   Patient agrees with plan of care and understands instructions. Call if worsening symptoms or any problems or concerns.

## 2021-10-15 NOTE — PROGRESS NOTES
The ABCs of the Annual Wellness Visit  Subsequent Medicare Wellness Visit    Chief Complaint   Patient presents with   • Medicare Wellness-subsequent      Subjective    History of Present Illness:  Enrique Gillette is a 89 y.o. male who presents for a Subsequent Medicare Wellness Visit.    The following portions of the patient's history were reviewed and   updated as appropriate: allergies, current medications, past family history, past medical history, past social history, past surgical history and problem list.    Compared to one year ago, the patient feels his physical   health is worse.    Compared to one year ago, the patient feels his mental   health is the same.    Recent Hospitalizations:  He was not admitted to the hospital during the last year.       Current Medical Providers:  Patient Care Team:  Kvng Hogue MD as PCP - General (Family Medicine)  Sam Maravilla MD as Consulting Physician (Urology)  Martin Betancourt MD as Consulting Physician (Dermatology)  Wicho Andrade DC (Chiropractic Medicine)    Outpatient Medications Prior to Visit   Medication Sig Dispense Refill   • acetaminophen (TYLENOL) 500 MG tablet Take 500 mg by mouth every 6 (six) hours as needed for mild pain (1-3).     • aspirin 81 MG EC tablet Take 81 mg by mouth daily.     • rosuvastatin (CRESTOR) 10 MG tablet TAKE 1 TABLET DAILY 90 tablet 3   • diphenhydrAMINE-acetaminophen (TYLENOL PM)  MG tablet per tablet Take 1 tablet by mouth At Night As Needed for Sleep.     • meclizine (ANTIVERT) 12.5 MG tablet Take 1 tablet by mouth 3 (Three) Times a Day As Needed for dizziness. 30 tablet 2   • NON FORMULARY Omega 3 joint pain     • PARoxetine (PAXIL) 10 MG tablet TAKE 1 TABLET EVERY MORNING 90 tablet 3     No facility-administered medications prior to visit.       No opioid medication identified on active medication list. I have reviewed chart for other potential  high risk medication/s and harmful drug interactions in the  "elderly.          Aspirin is on active medication list. Aspirin use is indicated based on review of current medical condition/s. Pros and cons of this therapy have been discussed today. Benefits of this medication outweigh potential harm.  Patient has been encouraged to continue taking this medication.  .      Patient Active Problem List   Diagnosis   • Stenosis, spinal, lumbar   • Arthritis of right knee     Advance Care Planning  Advance Directive is not on file.  ACP discussion was held with the patient during this visit. Patient has an advance directive (not in EMR), copy requested.          Objective    Vitals:    10/15/21 1428   BP: 124/80   BP Location: Left arm   Patient Position: Sitting   Cuff Size: Adult   Pulse: 80   Temp: 97.7 °F (36.5 °C)   TempSrc: Temporal   SpO2: 98%   Weight: 85.4 kg (188 lb 3.2 oz)   Height: 171.5 cm (67.5\")   PainSc: 0-No pain     BMI Readings from Last 1 Encounters:   10/15/21 29.04 kg/m²   BMI is above normal parameters. Recommendations include: none (medical contraindication)    Does the patient have evidence of cognitive impairment? No    Physical Exam  Vitals and nursing note reviewed.   Constitutional:       Appearance: He is well-developed.   HENT:      Head: Normocephalic.   Eyes:      Pupils: Pupils are equal, round, and reactive to light.   Cardiovascular:      Rate and Rhythm: Normal rate and regular rhythm.      Pulses: Normal pulses.      Heart sounds: Normal heart sounds.   Pulmonary:      Effort: Pulmonary effort is normal.      Breath sounds: Normal breath sounds.   Skin:     General: Skin is warm and dry.   Neurological:      Mental Status: He is alert and oriented to person, place, and time.   Psychiatric:         Behavior: Behavior normal.         Judgment: Judgment normal.                 HEALTH RISK ASSESSMENT    Smoking Status:  Social History     Tobacco Use   Smoking Status Never Smoker   Smokeless Tobacco Not on file     Alcohol Consumption:  Social " History     Substance and Sexual Activity   Alcohol Use No     Fall Risk Screen:    JENNIEADI Fall Risk Assessment was completed, and patient is at LOW risk for falls.Assessment completed on:10/15/2021    Depression Screening:  PHQ-2/PHQ-9 Depression Screening 10/15/2021   Little interest or pleasure in doing things 0   Feeling down, depressed, or hopeless 0   Trouble falling or staying asleep, or sleeping too much 0   Feeling tired or having little energy 0   Poor appetite or overeating 0   Feeling bad about yourself - or that you are a failure or have let yourself or your family down 0   Trouble concentrating on things, such as reading the newspaper or watching television 0   Moving or speaking so slowly that other people could have noticed. Or the opposite - being so fidgety or restless that you have been moving around a lot more than usual 0   Thoughts that you would be better off dead, or of hurting yourself in some way 0   Total Score 0   If you checked off any problems, how difficult have these problems made it for you to do your work, take care of things at home, or get along with other people? Not difficult at all       Health Habits and Functional and Cognitive Screening:  Functional & Cognitive Status 10/15/2021   Do you have difficulty preparing food and eating? No   Do you have difficulty bathing yourself, getting dressed or grooming yourself? Yes   Do you have difficulty using the toilet? No   Do you have difficulty moving around from place to place? Yes   Do you have trouble with steps or getting out of a bed or a chair? Yes   Current Diet Well Balanced Diet   Dental Exam Not up to date   Eye Exam Up to date   Exercise (times per week) 0 times per week   Current Exercises Include No Regular Exercise   Current Exercise Activities Include -   Do you need help using the phone?  No   Are you deaf or do you have serious difficulty hearing?  Yes   Do you need help with transportation? No   Do you need help  shopping? No   Do you need help preparing meals?  No   Do you need help with housework?  No   Do you need help with laundry? No   Do you need help taking your medications? No   Do you need help managing money? No   Do you ever drive or ride in a car without wearing a seat belt? No   Have you felt unusual stress, anger or loneliness in the last month? No   Who do you live with? Spouse   If you need help, do you have trouble finding someone available to you? No   Have you been bothered in the last four weeks by sexual problems? No   Do you have difficulty concentrating, remembering or making decisions? No       Age-appropriate Screening Schedule:  Refer to the list below for future screening recommendations based on patient's age, sex and/or medical conditions. Orders for these recommended tests are listed in the plan section. The patient has been provided with a written plan.    Health Maintenance   Topic Date Due   • TDAP/TD VACCINES (1 - Tdap) Never done   • LIPID PANEL  06/18/2021   • ZOSTER VACCINE (1 of 2) 10/24/2022 (Originally [birth year]20/1980)   • INFLUENZA VACCINE  Completed              Assessment/Plan   CMS Preventative Services Quick Reference  Risk Factors Identified During Encounter  Hearing Problem  Immunizations Discussed/Encouraged (specific Immunizations; Tdap and Shingrix  Inactivity/Sedentary  The above risks/problems have been discussed with the patient.  Follow up actions/plans if indicated are seen below in the Assessment/Plan Section.  Pertinent information has been shared with the patient in the After Visit Summary.    Diagnoses and all orders for this visit:    1. Medicare annual wellness visit, subsequent (Primary)  -     Lipid panel  -     Comprehensive Metabolic Panel    2. Hyperlipidemia, unspecified hyperlipidemia type  -     Lipid panel  -     Comprehensive Metabolic Panel    3. Chronic bilateral low back pain with bilateral sciatica  -     Lipid panel  -     Comprehensive Metabolic Panel  -      Ambulatory Referral to Physical Therapy Evaluate and treat        Follow Up:   Return if symptoms worsen or fail to improve.     An After Visit Summary and PPPS were made available to the patient.                 With HLD, taking crestor 10mg daily. Tolerating well. He is not fasting today.   C/o low back pain, leg weakness, wondering about PT, he is using cane, relieved with sitting worse with standing and walking. Prev had epidurals but did not help.     Medicare wellness today,   Refer to PT he will call for appt,   Labs today will call with results.   Patient agrees with plan of care and understands instructions. Call if worsening symptoms or any problems or concerns.

## 2021-10-29 RX ORDER — ROSUVASTATIN CALCIUM 10 MG/1
TABLET, COATED ORAL
Qty: 90 TABLET | Refills: 3 | Status: SHIPPED | OUTPATIENT
Start: 2021-10-29 | End: 2022-10-24

## 2022-08-04 NOTE — TELEPHONE ENCOUNTER
PT SAID HE WAS IN Wednesday AND WAS SENT FOR MRI PT WAS TOLD BY ELI TO CALL HER TO LET HER KNOW HOW HES DOING,WOULD LIKE TO SPEAK WITH NURSE    [phone]  
Please let him know to f/u in office with Dr. Hogue if symptoms persist.   
Pt stated he is doing very well. He took the meds you gave him and he hasnt had the dizzy spell at all over the weekend.   
No

## 2022-10-24 RX ORDER — ROSUVASTATIN CALCIUM 10 MG/1
TABLET, COATED ORAL
Qty: 90 TABLET | Refills: 3 | Status: SHIPPED | OUTPATIENT
Start: 2022-10-24

## 2022-11-02 ENCOUNTER — OFFICE VISIT (OUTPATIENT)
Dept: FAMILY MEDICINE CLINIC | Facility: CLINIC | Age: 87
End: 2022-11-02

## 2022-11-02 VITALS
HEART RATE: 93 BPM | RESPIRATION RATE: 18 BRPM | HEIGHT: 68 IN | DIASTOLIC BLOOD PRESSURE: 68 MMHG | BODY MASS INDEX: 26.83 KG/M2 | WEIGHT: 177 LBS | TEMPERATURE: 97.5 F | SYSTOLIC BLOOD PRESSURE: 108 MMHG | OXYGEN SATURATION: 97 %

## 2022-11-02 DIAGNOSIS — E78.5 HYPERLIPIDEMIA, UNSPECIFIED HYPERLIPIDEMIA TYPE: ICD-10-CM

## 2022-11-02 DIAGNOSIS — Z00.00 MEDICARE ANNUAL WELLNESS VISIT, SUBSEQUENT: Primary | ICD-10-CM

## 2022-11-02 DIAGNOSIS — M54.50 LUMBAR PAIN: ICD-10-CM

## 2022-11-02 LAB
ALBUMIN SERPL-MCNC: 4 G/DL (ref 3.5–5.2)
ALBUMIN/GLOB SERPL: 1.6 G/DL
ALP SERPL-CCNC: 59 U/L (ref 39–117)
ALT SERPL W P-5'-P-CCNC: 16 U/L (ref 1–41)
ANION GAP SERPL CALCULATED.3IONS-SCNC: 10.2 MMOL/L (ref 5–15)
AST SERPL-CCNC: 33 U/L (ref 1–40)
BILIRUB SERPL-MCNC: 0.6 MG/DL (ref 0–1.2)
BUN SERPL-MCNC: 21 MG/DL (ref 8–23)
BUN/CREAT SERPL: 17.1 (ref 7–25)
CALCIUM SPEC-SCNC: 9.3 MG/DL (ref 8.2–9.6)
CHLORIDE SERPL-SCNC: 107 MMOL/L (ref 98–107)
CHOLEST SERPL-MCNC: 100 MG/DL (ref 0–200)
CO2 SERPL-SCNC: 25.8 MMOL/L (ref 22–29)
CREAT SERPL-MCNC: 1.23 MG/DL (ref 0.76–1.27)
EGFRCR SERPLBLD CKD-EPI 2021: 55.1 ML/MIN/1.73
ERYTHROCYTE [DISTWIDTH] IN BLOOD BY AUTOMATED COUNT: 15.5 % (ref 12.3–15.4)
GLOBULIN UR ELPH-MCNC: 2.5 GM/DL
GLUCOSE SERPL-MCNC: 110 MG/DL (ref 65–99)
HCT VFR BLD AUTO: 41 % (ref 37.5–51)
HDLC SERPL-MCNC: 41 MG/DL (ref 40–60)
HGB BLD-MCNC: 13.6 G/DL (ref 13–17.7)
LDLC SERPL CALC-MCNC: 43 MG/DL (ref 0–100)
LDLC/HDLC SERPL: 1.05 {RATIO}
LYMPHOCYTES # BLD AUTO: 1.6 10*3/MM3 (ref 0.7–3.1)
LYMPHOCYTES NFR BLD AUTO: 20 % (ref 19.6–45.3)
MCH RBC QN AUTO: 29.3 PG (ref 26.6–33)
MCHC RBC AUTO-ENTMCNC: 33.3 G/DL (ref 31.5–35.7)
MCV RBC AUTO: 88 FL (ref 79–97)
MONOCYTES # BLD AUTO: 0.6 10*3/MM3 (ref 0.1–0.9)
MONOCYTES NFR BLD AUTO: 8.2 % (ref 5–12)
NEUTROPHILS NFR BLD AUTO: 5.7 10*3/MM3 (ref 1.7–7)
NEUTROPHILS NFR BLD AUTO: 71.8 % (ref 42.7–76)
PLATELET # BLD AUTO: 225 10*3/MM3 (ref 140–450)
PMV BLD AUTO: 8 FL (ref 6–12)
POTASSIUM SERPL-SCNC: 4.2 MMOL/L (ref 3.5–5.2)
PROT SERPL-MCNC: 6.5 G/DL (ref 6–8.5)
RBC # BLD AUTO: 4.65 10*6/MM3 (ref 4.14–5.8)
SODIUM SERPL-SCNC: 143 MMOL/L (ref 136–145)
TRIGL SERPL-MCNC: 79 MG/DL (ref 0–150)
VLDLC SERPL-MCNC: 16 MG/DL (ref 5–40)
WBC NRBC COR # BLD: 7.9 10*3/MM3 (ref 3.4–10.8)

## 2022-11-02 PROCEDURE — G0439 PPPS, SUBSEQ VISIT: HCPCS | Performed by: FAMILY MEDICINE

## 2022-11-02 PROCEDURE — 80061 LIPID PANEL: CPT | Performed by: FAMILY MEDICINE

## 2022-11-02 PROCEDURE — 85025 COMPLETE CBC W/AUTO DIFF WBC: CPT | Performed by: FAMILY MEDICINE

## 2022-11-02 PROCEDURE — 80053 COMPREHEN METABOLIC PANEL: CPT | Performed by: FAMILY MEDICINE

## 2022-11-02 PROCEDURE — 1170F FXNL STATUS ASSESSED: CPT | Performed by: FAMILY MEDICINE

## 2022-11-02 PROCEDURE — 1126F AMNT PAIN NOTED NONE PRSNT: CPT | Performed by: FAMILY MEDICINE

## 2022-11-02 PROCEDURE — 1159F MED LIST DOCD IN RCRD: CPT | Performed by: FAMILY MEDICINE

## 2022-11-02 PROCEDURE — 36415 COLL VENOUS BLD VENIPUNCTURE: CPT | Performed by: FAMILY MEDICINE

## 2022-11-02 NOTE — PROGRESS NOTES
The ABCs of the Annual Wellness Visit  Subsequent Medicare Wellness Visit    Chief Complaint   Patient presents with   • Medicare Wellness-subsequent      Subjective    History of Present Illness:  Enrique Gillette is a 89 y.o. male who presents for a Subsequent Medicare Wellness Visit.    The following portions of the patient's history were reviewed and   updated as appropriate: allergies, current medications, past medical history, past surgical history and problem list.    Compared to one year ago, the patient feels his physical   health is the same.    Compared to one year ago, the patient feels his mental   health is worse.    Recent Hospitalizations:  He was not admitted to the hospital during the last year.       Current Medical Providers:  Patient Care Team:  Kvng Hogue MD as PCP - General (Family Medicine)  Sam Maravilla MD as Consulting Physician (Urology)  Martin Betancourt MD as Consulting Physician (Dermatology)  Wicho Andrade DC (Chiropractic Medicine)    Outpatient Medications Prior to Visit   Medication Sig Dispense Refill   • acetaminophen (TYLENOL) 500 MG tablet Take 500 mg by mouth every 6 (six) hours as needed for mild pain (1-3).     • aspirin 81 MG EC tablet Take 81 mg by mouth daily.     • meclizine (ANTIVERT) 12.5 MG tablet Take 1 tablet by mouth 3 (Three) Times a Day As Needed for dizziness. 30 tablet 2   • NON FORMULARY Omega 3 joint pain     • rosuvastatin (CRESTOR) 10 MG tablet TAKE 1 TABLET DAILY 90 tablet 3     No facility-administered medications prior to visit.       No opioid medication identified on active medication list. I have reviewed chart for other potential  high risk medication/s and harmful drug interactions in the elderly.          Aspirin is on active medication list. Aspirin use is indicated based on review of current medical condition/s. Pros and cons of this therapy have been discussed today. Benefits of this medication outweigh potential harm.  Patient has  "been encouraged to continue taking this medication.  .      Patient Active Problem List   Diagnosis   • Stenosis, spinal, lumbar   • Arthritis of right knee     Advance Care Planning  Advance Directive is not on file.  ACP discussion was held with the patient during this visit. Patient has an advance directive (not in EMR), copy requested.          Objective    Vitals:    11/02/22 1358   BP: 108/68   BP Location: Left arm   Patient Position: Sitting   Pulse: 93   Resp: 18   Temp: 97.5 °F (36.4 °C)   TempSrc: Infrared   SpO2: 97%   Weight: 80.3 kg (177 lb)   Height: 171.5 cm (67.52\")   PainSc: 0-No pain     Estimated body mass index is 27.3 kg/m² as calculated from the following:    Height as of this encounter: 171.5 cm (67.52\").    Weight as of this encounter: 80.3 kg (177 lb).    BMI is >= 25 and <30. (Overweight) The following options were offered after discussion;: nutrition counseling/recommendations      Does the patient have evidence of cognitive impairment? No    Physical Exam            HEALTH RISK ASSESSMENT    Smoking Status:  Social History     Tobacco Use   Smoking Status Never   Smokeless Tobacco Not on file     Alcohol Consumption:  Social History     Substance and Sexual Activity   Alcohol Use No     Fall Risk Screen:    STEADI Fall Risk Assessment was completed, and patient is at MODERATE risk for falls. Assessment completed on:11/2/2022    Depression Screening:  PHQ-2/PHQ-9 Depression Screening 10/15/2021   Retired PHQ-9 Total Score 0   Retired Total Score 0       Health Habits and Functional and Cognitive Screening:  Functional & Cognitive Status 11/2/2022   Do you have difficulty preparing food and eating? No   Do you have difficulty bathing yourself, getting dressed or grooming yourself? No   Do you have difficulty using the toilet? No   Do you have difficulty moving around from place to place? No   Do you have trouble with steps or getting out of a bed or a chair? Yes   Current Diet Frequent " Junk Food   Dental Exam Not up to date   Eye Exam Up to date   Exercise (times per week) 0 times per week   Current Exercises Include -   Current Exercise Activities Include -   Do you need help using the phone?  No   Are you deaf or do you have serious difficulty hearing?  No   Do you need help with transportation? No   Do you need help shopping? No   Do you need help preparing meals?  No   Do you need help with housework?  No   Do you need help with laundry? No   Do you need help taking your medications? No   Do you need help managing money? No   Do you ever drive or ride in a car without wearing a seat belt? No   Have you felt unusual stress, anger or loneliness in the last month? Yes   Who do you live with? Spouse   If you need help, do you have trouble finding someone available to you? No   Have you been bothered in the last four weeks by sexual problems? No   Do you have difficulty concentrating, remembering or making decisions? No       Age-appropriate Screening Schedule:  Refer to the list below for future screening recommendations based on patient's age, sex and/or medical conditions. Orders for these recommended tests are listed in the plan section. The patient has been provided with a written plan.    Health Maintenance   Topic Date Due   • TDAP/TD VACCINES (1 - Tdap) Never done   • ZOSTER VACCINE (1 of 2) Never done   • LIPID PANEL  10/15/2022   • INFLUENZA VACCINE  Completed              Assessment & Plan   CMS Preventative Services Quick Reference  Risk Factors Identified During Encounter  Immunizations Discussed/Encouraged (specific Immunizations; Influenza and COVID19  The above risks/problems have been discussed with the patient.  Follow up actions/plans if indicated are seen below in the Assessment/Plan Section.  Pertinent information has been shared with the patient in the After Visit Summary.    Diagnoses and all orders for this visit:    1. Medicare annual wellness visit, subsequent (Primary)  -  "    CBC & Differential  -     Comprehensive Metabolic Panel  -     Lipid Panel    2. Hyperlipidemia, unspecified hyperlipidemia type  -     CBC & Differential  -     Comprehensive Metabolic Panel  -     Lipid Panel    3. Lumbar pain        Follow Up:   No follow-ups on file.     An After Visit Summary and PPPS were made available to the patient.          I have reviewed the above.    SUBJECTIVE:  The patient is a 89-year-old male.  He has hyperlipidemia.  He will receive a Medicare wellness exam today.  He complains of lumbar pain.  This is been present for many years he takes arthritis pill twice a day for it.  This usually helps however the pain is constant and is position related.    PAST MEDICAL HISTORY:  Reviewed.    REVIEW OF SYSTEMS:  Please see above.  All others reviewed and are negative.      OBJECTIVE:   /68 (BP Location: Left arm, Patient Position: Sitting)   Pulse 93   Temp 97.5 °F (36.4 °C) (Infrared)   Resp 18   Ht 171.5 cm (67.52\")   Wt 80.3 kg (177 lb)   SpO2 97%   BMI 27.30 kg/m²    Vitals signs are reviewed and are stable.    General:  Well-nourished.  Alert and oriented x3 in no acute distress.  HEENT: PERRLA.   Neck:  Supple.   Lungs:  Clear.    Heart:  Regular rate and rhythm.   Abdomen:   Soft, nontender.   Extremities:  No cyanosis, clubbing or edema.   Neurological:  Grossly intact without motor or sensory deficits.     ASSESSMENT:      Diagnoses and all orders for this visit:    1. Medicare annual wellness visit, subsequent (Primary)  -     CBC & Differential  -     Comprehensive Metabolic Panel  -     Lipid Panel    2. Hyperlipidemia, unspecified hyperlipidemia type  -     CBC & Differential  -     Comprehensive Metabolic Panel  -     Lipid Panel    3. Lumbar pain         PLAN: See above labs.  Healthy lifestyle discussed.  Continue what you are doing.  Call if problems.  Follow-up in a year unless problems.    Dictated utilizing Dragon dictation.             "

## 2023-01-31 ENCOUNTER — OFFICE VISIT (OUTPATIENT)
Dept: FAMILY MEDICINE CLINIC | Facility: CLINIC | Age: 88
End: 2023-01-31
Payer: MEDICARE

## 2023-01-31 VITALS
WEIGHT: 175 LBS | OXYGEN SATURATION: 97 % | HEIGHT: 68 IN | HEART RATE: 63 BPM | BODY MASS INDEX: 26.52 KG/M2 | DIASTOLIC BLOOD PRESSURE: 70 MMHG | RESPIRATION RATE: 18 BRPM | TEMPERATURE: 97.8 F | SYSTOLIC BLOOD PRESSURE: 120 MMHG

## 2023-01-31 DIAGNOSIS — R93.89 ABNORMAL X-RAY: ICD-10-CM

## 2023-01-31 DIAGNOSIS — R22.41 LEG MASS, RIGHT: ICD-10-CM

## 2023-01-31 DIAGNOSIS — M79.89 RIGHT LEG SWELLING: Primary | ICD-10-CM

## 2023-01-31 PROCEDURE — 99213 OFFICE O/P EST LOW 20 MIN: CPT

## 2023-01-31 NOTE — PROGRESS NOTES
"Chief Complaint  follow up [facility] (Swollen area right upper outer leg, seen Sunday and had xray done there yesterday they told him to follow up with PCP)    Subjective        Enrique Gillette presents to [facility]  History of Present Illness  Patient is a 89-year-old male, patient of Dr. Hogue, last seen in office 11/2/2022.  New patient to me.  Patient was seen at [facility] on 1/29/2023 for right leg swelling that started 1 week ago.  X-ray completed on femur, x-ray reveals possible intramuscular hematoma or soft tissue mass.  Recommended MRI without and with contrast for further eval. Today, patient reports the swelling is still present, but hasn't grown in size. Patient denies injury to leg, pain, and bruising.    Objective   Vital Signs:  /70 (BP Location: Left arm, Patient Position: Sitting, Cuff Size: Adult)   Pulse 63   Temp 97.8 °F (36.6 °C) (Infrared)   Resp 18   Ht 171.5 cm (67.52\")   Wt 79.4 kg (175 lb)   SpO2 97%   BMI 26.99 kg/m²   Estimated body mass index is 26.99 kg/m² as calculated from the following:    Height as of this encounter: 171.5 cm (67.52\").    Weight as of this encounter: 79.4 kg (175 lb).             Physical Exam  Constitutional:       General: He is awake.      Appearance: Normal appearance.   HENT:      Head: Normocephalic and atraumatic.      Nose: Nose normal.   Eyes:      Extraocular Movements: Extraocular movements intact.      Conjunctiva/sclera: Conjunctivae normal.      Pupils: Pupils are equal, round, and reactive to light.   Cardiovascular:      Rate and Rhythm: Normal rate and regular rhythm.      Pulses: Normal pulses.      Heart sounds: Normal heart sounds.   Pulmonary:      Effort: Pulmonary effort is normal.      Breath sounds: Normal breath sounds and air entry.   Musculoskeletal:        Legs:    Skin:     General: Skin is warm and dry.   Neurological:      General: No focal deficit present.      Mental Status: He is " alert and oriented to person, place, and time. Mental status is at baseline.   Psychiatric:         Attention and Perception: Attention normal.         Behavior: Behavior normal. Behavior is cooperative.        Result Review :                   Assessment and Plan   Diagnoses and all orders for this visit:    1. Right leg swelling (Primary)  -     MRI femur right w wo contrast; Future    2. Leg mass, right  -     MRI femur right w wo contrast; Future    3. Abnormal x-ray  -     MRI femur right w wo contrast; Future    You should hear back from our office by the end of the week to have your MRI scheduled.  If you do not, please call our office to get an update.    Patient agrees with plan of care and understands instructions. Call if worsening symptoms or any problems or concerns.          Follow Up   Return if symptoms worsen or fail to improve.  Patient was given instructions and counseling regarding his condition or for health maintenance advice. Please see specific information pulled into the AVS if appropriate.

## 2023-01-31 NOTE — PATIENT INSTRUCTIONS
You should hear back from our office by the end of the week to have your MRI scheduled.  If you do not, please call our office to get an update.    Patient agrees with plan of care and understands instructions. Call if worsening symptoms or any problems or concerns.

## 2023-03-21 ENCOUNTER — HOSPITAL ENCOUNTER (OUTPATIENT)
Dept: MRI IMAGING | Facility: HOSPITAL | Age: 88
Discharge: HOME | End: 2023-03-21
Payer: MEDICARE

## 2023-03-21 DIAGNOSIS — R22.41 LEG MASS, RIGHT: ICD-10-CM

## 2023-03-21 DIAGNOSIS — M79.89 RIGHT LEG SWELLING: ICD-10-CM

## 2023-03-21 DIAGNOSIS — R93.89 ABNORMAL X-RAY: ICD-10-CM

## 2023-03-21 LAB — CREAT BLDA-MCNC: 1.3 MG/DL (ref 0.6–1.3)

## 2023-03-21 PROCEDURE — 0 GADOBENATE DIMEGLUMINE 529 MG/ML SOLUTION

## 2023-03-21 PROCEDURE — A9577 INJ MULTIHANCE: HCPCS

## 2023-03-21 PROCEDURE — 82565 ASSAY OF CREATININE: CPT

## 2023-03-21 PROCEDURE — 73720 MRI LWR EXTREMITY W/O&W/DYE: CPT

## 2023-03-21 RX ADMIN — GADOBENATE DIMEGLUMINE 16 ML: 529 INJECTION, SOLUTION INTRAVENOUS at 15:39

## 2023-04-12 ENCOUNTER — TELEPHONE (OUTPATIENT)
Dept: FAMILY MEDICINE CLINIC | Facility: CLINIC | Age: 88
End: 2023-04-12
Payer: MEDICARE

## 2023-04-12 NOTE — TELEPHONE ENCOUNTER
Patient was here today and wanted me to ask if Dr. Hogue could work him in before he leaves the practice.

## 2023-05-11 ENCOUNTER — OFFICE VISIT (OUTPATIENT)
Dept: FAMILY MEDICINE CLINIC | Facility: CLINIC | Age: 88
End: 2023-05-11
Payer: MEDICARE

## 2023-05-11 VITALS
RESPIRATION RATE: 18 BRPM | HEIGHT: 68 IN | BODY MASS INDEX: 25.61 KG/M2 | WEIGHT: 169 LBS | HEART RATE: 88 BPM | DIASTOLIC BLOOD PRESSURE: 70 MMHG | SYSTOLIC BLOOD PRESSURE: 110 MMHG | OXYGEN SATURATION: 94 % | TEMPERATURE: 97.7 F

## 2023-05-11 DIAGNOSIS — E78.5 HYPERLIPIDEMIA, UNSPECIFIED HYPERLIPIDEMIA TYPE: ICD-10-CM

## 2023-05-11 DIAGNOSIS — R22.41 MASS OF RIGHT LOWER EXTREMITY: Primary | ICD-10-CM

## 2023-05-11 DIAGNOSIS — R32 URINARY INCONTINENCE, UNSPECIFIED TYPE: ICD-10-CM

## 2023-05-11 DIAGNOSIS — M48.061 SPINAL STENOSIS OF LUMBAR REGION, UNSPECIFIED WHETHER NEUROGENIC CLAUDICATION PRESENT: ICD-10-CM

## 2023-05-11 DIAGNOSIS — M17.11 ARTHRITIS OF RIGHT KNEE: ICD-10-CM

## 2023-05-11 PROCEDURE — 99214 OFFICE O/P EST MOD 30 MIN: CPT | Performed by: FAMILY MEDICINE

## 2023-05-11 NOTE — PROGRESS NOTES
"SUBJECTIVE:  The patient is a 89-year-old male.  He has hyperlipidemia arthritis and lumbar stenosis.  He recently had some skin cancers removed.  He complains of soreness of his right thigh.  Swelling has been present for about 4 months.  He had an MRI about 4 to 5 weeks ago.  This revealed a hematoma and a possible solid mass.  He is here today to discuss.  He also has urinary leakage which occurs at random times.  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    PAST MEDICAL HISTORY:  Reviewed.    REVIEW OF SYSTEMS:  Please see above.  All others reviewed and are negative.      OBJECTIVE:   /70 (BP Location: Left arm, Patient Position: Sitting, Cuff Size: Adult)   Pulse 88   Temp 97.7 °F (36.5 °C) (Infrared)   Resp 18   Ht 171.5 cm (67.52\")   Wt 76.7 kg (169 lb)   SpO2 94%   BMI 26.06 kg/m²    Vitals signs are reviewed and are stable.    General:  Well-nourished.  Alert and oriented x3 in no acute distress.  HEENT: PERRLA.   Neck:  Supple.   Lungs:  Clear.    Heart:  Regular rate and rhythm.   Abdomen:   Soft, nontender.   Extremities: A 10 x 3 x 7 cm palpable areas noted in the right anterior thigh.  Superficial ecchymoses present.  Neurological:  Grossly intact without motor or sensory deficits.     ASSESSMENT:      Diagnoses and all orders for this visit:    1. Hyperlipidemia, unspecified hyperlipidemia type (Primary)    2. Arthritis of right knee    3. Spinal stenosis of lumbar region, unspecified whether neurogenic claudication present    4. Mass of right lower extremity  -     Ambulatory Referral to Orthopedic Surgery    5. Urinary incontinence, unspecified type  -     Ambulatory Referral to Urology         PLAN: He is referred to orthopedic surgeon and urology for his current problems.  Healthy lifestyle discussed.  He will call if any problems.    Dictated utilizing Dragon dictation.    "

## 2023-09-07 ENCOUNTER — TELEPHONE (OUTPATIENT)
Dept: FAMILY MEDICINE CLINIC | Facility: CLINIC | Age: 88
End: 2023-09-07
Payer: MEDICARE

## 2023-09-07 NOTE — TELEPHONE ENCOUNTER
"----- Message from Nazanin Lucio MD sent at 9/6/2023  5:39 PM EDT -----  Regarding: FW: Appointment Request  Contact: [phone]  Please call daughter,   It appears he was referred to specialist for further evaluation per Dr. Hogue note.  Did he go see the surgeon for further evaluation?  If lesion has swollen more and painful I advise going to the ER or back to surgeon for further evaluation.    Dr. Lucio    ----- Message -----  From: Isela Rodriguez  Sent: 9/6/2023   8:55 AM EDT  To: Nazanin Lucio MD  Subject: FW: Appointment Request                          Please advise.   ----- Message -----  From: Nighat Cazares RegSched Rep  Sent: 9/6/2023   7:45 AM EDT  To: [facility] Clinical Pool  Subject: FW: Appointment Request                          No Available appointments.  ----- Message -----  From: Enrique Gillette \"Barry\"  Sent: 9/5/2023  10:53 PM EDT  To: [facility] Desk Pool  Subject: Appointment Request                              Appointment Request From: Enrique Gillette    With Provider: Nazanin Lucio MD [[facility]]    Preferred Date Range: Any    Preferred Times: Any Time    Reason for visit: Problem Follow-Up Visit    Comments:  Right thigh swelling is worse and now somewhat painful. See previous MRI. Need to know next steps to address. Call carroll Centeno ([phone]) to schedule or discuss please. Thanks        "

## 2023-09-07 NOTE — TELEPHONE ENCOUNTER
Please call daughter,  It appears he was referred to specialist for further evaluation per Dr. Hogue note.  Did he go see the surgeon for further evaluation?  If lesion has swollen more and painful I advise going to the ER or back to surgeon for further evaluation.    Dr. Lucio        Spoke with Taryn, informed of Dr. Lucio's suggestions.

## 2023-09-12 ENCOUNTER — HOSPITAL ENCOUNTER (INPATIENT)
Facility: HOSPITAL | Age: 88
LOS: 5 days | Discharge: HOME | DRG: 844 | End: 2023-09-17
Attending: EMERGENCY MEDICINE | Admitting: STUDENT IN AN ORGANIZED HEALTH CARE EDUCATION/TRAINING PROGRAM
Payer: MEDICARE

## 2023-09-12 ENCOUNTER — APPOINTMENT (OUTPATIENT)
Dept: CT IMAGING | Facility: HOSPITAL | Age: 88
DRG: 844 | End: 2023-09-12
Payer: MEDICARE

## 2023-09-12 ENCOUNTER — APPOINTMENT (OUTPATIENT)
Dept: CARDIOLOGY | Facility: HOSPITAL | Age: 88
DRG: 844 | End: 2023-09-12
Payer: MEDICARE

## 2023-09-12 DIAGNOSIS — D64.9 ACUTE ANEMIA: ICD-10-CM

## 2023-09-12 DIAGNOSIS — R22.41 MASS OF RIGHT LOWER EXTREMITY: Primary | ICD-10-CM

## 2023-09-12 PROBLEM — G89.29 CHRONIC BACK PAIN: Status: ACTIVE | Noted: 2023-09-12

## 2023-09-12 PROBLEM — E78.5 HLD (HYPERLIPIDEMIA): Status: ACTIVE | Noted: 2023-09-12

## 2023-09-12 PROBLEM — M54.9 CHRONIC BACK PAIN: Status: ACTIVE | Noted: 2023-09-12

## 2023-09-12 LAB
ABO GROUP BLD: NORMAL
ALBUMIN SERPL-MCNC: 2.9 G/DL (ref 3.5–5.2)
ALBUMIN/GLOB SERPL: 0.8 G/DL
ALP SERPL-CCNC: 153 U/L (ref 39–117)
ALT SERPL W P-5'-P-CCNC: 15 U/L (ref 1–41)
ANION GAP SERPL CALCULATED.3IONS-SCNC: 10 MMOL/L (ref 5–15)
APTT PPP: 36.7 SECONDS (ref 22.7–35.4)
AST SERPL-CCNC: 27 U/L (ref 1–40)
BASOPHILS # BLD AUTO: 0.04 10*3/MM3 (ref 0–0.2)
BASOPHILS NFR BLD AUTO: 0.4 % (ref 0–1.5)
BH CV LOWER VASCULAR LEFT COMMON FEMORAL AUGMENT: NORMAL
BH CV LOWER VASCULAR LEFT COMMON FEMORAL COMPETENT: NORMAL
BH CV LOWER VASCULAR LEFT COMMON FEMORAL COMPRESS: NORMAL
BH CV LOWER VASCULAR LEFT COMMON FEMORAL PHASIC: NORMAL
BH CV LOWER VASCULAR LEFT COMMON FEMORAL SPONT: NORMAL
BH CV LOWER VASCULAR RIGHT COMMON FEMORAL AUGMENT: NORMAL
BH CV LOWER VASCULAR RIGHT COMMON FEMORAL COMPETENT: NORMAL
BH CV LOWER VASCULAR RIGHT COMMON FEMORAL COMPRESS: NORMAL
BH CV LOWER VASCULAR RIGHT COMMON FEMORAL PHASIC: NORMAL
BH CV LOWER VASCULAR RIGHT COMMON FEMORAL SPONT: NORMAL
BH CV LOWER VASCULAR RIGHT DISTAL FEMORAL COMPRESS: NORMAL
BH CV LOWER VASCULAR RIGHT GASTRONEMIUS COMPRESS: NORMAL
BH CV LOWER VASCULAR RIGHT GREATER SAPH AK COMPRESS: NORMAL
BH CV LOWER VASCULAR RIGHT GREATER SAPH BK COMPRESS: NORMAL
BH CV LOWER VASCULAR RIGHT LESSER SAPH COMPRESS: NORMAL
BH CV LOWER VASCULAR RIGHT MID FEMORAL AUGMENT: NORMAL
BH CV LOWER VASCULAR RIGHT MID FEMORAL COMPETENT: NORMAL
BH CV LOWER VASCULAR RIGHT MID FEMORAL COMPRESS: NORMAL
BH CV LOWER VASCULAR RIGHT MID FEMORAL PHASIC: NORMAL
BH CV LOWER VASCULAR RIGHT MID FEMORAL SPONT: NORMAL
BH CV LOWER VASCULAR RIGHT PERONEAL COMPRESS: NORMAL
BH CV LOWER VASCULAR RIGHT POPLITEAL AUGMENT: NORMAL
BH CV LOWER VASCULAR RIGHT POPLITEAL COMPETENT: NORMAL
BH CV LOWER VASCULAR RIGHT POPLITEAL COMPRESS: NORMAL
BH CV LOWER VASCULAR RIGHT POPLITEAL PHASIC: NORMAL
BH CV LOWER VASCULAR RIGHT POPLITEAL SPONT: NORMAL
BH CV LOWER VASCULAR RIGHT POSTERIOR TIBIAL COMPRESS: NORMAL
BH CV LOWER VASCULAR RIGHT PROFUNDA FEMORAL COMPRESS: NORMAL
BH CV LOWER VASCULAR RIGHT PROXIMAL FEMORAL COMPRESS: NORMAL
BH CV LOWER VASCULAR RIGHT SAPHENOFEMORAL JUNCTION COMPRESS: NORMAL
BH CV VAS PRELIMINARY FINDINGS SCRIPTING: 1
BILIRUB SERPL-MCNC: 0.5 MG/DL (ref 0–1.2)
BLD GP AB SCN SERPL QL: NEGATIVE
BUN SERPL-MCNC: 22 MG/DL (ref 8–23)
BUN/CREAT SERPL: 20.8 (ref 7–25)
CALCIUM SPEC-SCNC: 8.9 MG/DL (ref 8.2–9.6)
CHLORIDE SERPL-SCNC: 109 MMOL/L (ref 98–107)
CO2 SERPL-SCNC: 22 MMOL/L (ref 22–29)
CREAT SERPL-MCNC: 1.06 MG/DL (ref 0.76–1.27)
DEPRECATED RDW RBC AUTO: 43 FL (ref 37–54)
EGFRCR SERPLBLD CKD-EPI 2021: 65.4 ML/MIN/1.73
EOSINOPHIL # BLD AUTO: 0.27 10*3/MM3 (ref 0–0.4)
EOSINOPHIL NFR BLD AUTO: 2.4 % (ref 0.3–6.2)
ERYTHROCYTE [DISTWIDTH] IN BLOOD BY AUTOMATED COUNT: 14.9 % (ref 12.3–15.4)
FERRITIN SERPL-MCNC: 466 NG/ML (ref 30–400)
GLOBULIN UR ELPH-MCNC: 3.6 GM/DL
GLUCOSE SERPL-MCNC: 109 MG/DL (ref 65–99)
HCT VFR BLD AUTO: 32.1 % (ref 37.5–51)
HGB BLD-MCNC: 9.8 G/DL (ref 13–17.7)
IMM GRANULOCYTES # BLD AUTO: 0.05 10*3/MM3 (ref 0–0.05)
IMM GRANULOCYTES NFR BLD AUTO: 0.4 % (ref 0–0.5)
INR PPP: 1.33 (ref 0.9–1.1)
IRON 24H UR-MRATE: 22 MCG/DL (ref 59–158)
IRON SATN MFR SERPL: 8 % (ref 20–50)
LYMPHOCYTES # BLD AUTO: 0.74 10*3/MM3 (ref 0.7–3.1)
LYMPHOCYTES NFR BLD AUTO: 6.6 % (ref 19.6–45.3)
MCH RBC QN AUTO: 24.6 PG (ref 26.6–33)
MCHC RBC AUTO-ENTMCNC: 30.5 G/DL (ref 31.5–35.7)
MCV RBC AUTO: 80.7 FL (ref 79–97)
MONOCYTES # BLD AUTO: 0.96 10*3/MM3 (ref 0.1–0.9)
MONOCYTES NFR BLD AUTO: 8.5 % (ref 5–12)
NEUTROPHILS NFR BLD AUTO: 81.7 % (ref 42.7–76)
NEUTROPHILS NFR BLD AUTO: 9.17 10*3/MM3 (ref 1.7–7)
NRBC BLD AUTO-RTO: 0 /100 WBC (ref 0–0.2)
PLATELET # BLD AUTO: 302 10*3/MM3 (ref 140–450)
PMV BLD AUTO: 10.7 FL (ref 6–12)
POTASSIUM SERPL-SCNC: 4 MMOL/L (ref 3.5–5.2)
PROT SERPL-MCNC: 6.5 G/DL (ref 6–8.5)
PROTHROMBIN TIME: 16.7 SECONDS (ref 11.7–14.2)
RBC # BLD AUTO: 3.98 10*6/MM3 (ref 4.14–5.8)
RH BLD: POSITIVE
SODIUM SERPL-SCNC: 141 MMOL/L (ref 136–145)
T&S EXPIRATION DATE: NORMAL
TIBC SERPL-MCNC: 265 MCG/DL (ref 298–536)
TRANSFERRIN SERPL-MCNC: 178 MG/DL (ref 200–360)
WBC NRBC COR # BLD: 11.23 10*3/MM3 (ref 3.4–10.8)

## 2023-09-12 PROCEDURE — 93971 EXTREMITY STUDY: CPT

## 2023-09-12 PROCEDURE — 85610 PROTHROMBIN TIME: CPT | Performed by: NURSE PRACTITIONER

## 2023-09-12 PROCEDURE — 83540 ASSAY OF IRON: CPT | Performed by: STUDENT IN AN ORGANIZED HEALTH CARE EDUCATION/TRAINING PROGRAM

## 2023-09-12 PROCEDURE — 86901 BLOOD TYPING SEROLOGIC RH(D): CPT | Performed by: NURSE PRACTITIONER

## 2023-09-12 PROCEDURE — 86850 RBC ANTIBODY SCREEN: CPT | Performed by: NURSE PRACTITIONER

## 2023-09-12 PROCEDURE — 85025 COMPLETE CBC W/AUTO DIFF WBC: CPT | Performed by: NURSE PRACTITIONER

## 2023-09-12 PROCEDURE — 25510000001 IOPAMIDOL 61 % SOLUTION: Performed by: STUDENT IN AN ORGANIZED HEALTH CARE EDUCATION/TRAINING PROGRAM

## 2023-09-12 PROCEDURE — 82728 ASSAY OF FERRITIN: CPT | Performed by: STUDENT IN AN ORGANIZED HEALTH CARE EDUCATION/TRAINING PROGRAM

## 2023-09-12 PROCEDURE — 85730 THROMBOPLASTIN TIME PARTIAL: CPT | Performed by: NURSE PRACTITIONER

## 2023-09-12 PROCEDURE — 84466 ASSAY OF TRANSFERRIN: CPT | Performed by: STUDENT IN AN ORGANIZED HEALTH CARE EDUCATION/TRAINING PROGRAM

## 2023-09-12 PROCEDURE — 99285 EMERGENCY DEPT VISIT HI MDM: CPT

## 2023-09-12 PROCEDURE — 36415 COLL VENOUS BLD VENIPUNCTURE: CPT

## 2023-09-12 PROCEDURE — 80053 COMPREHEN METABOLIC PANEL: CPT | Performed by: NURSE PRACTITIONER

## 2023-09-12 PROCEDURE — 73701 CT LOWER EXTREMITY W/DYE: CPT

## 2023-09-12 PROCEDURE — 86900 BLOOD TYPING SEROLOGIC ABO: CPT | Performed by: NURSE PRACTITIONER

## 2023-09-12 RX ORDER — ACETAMINOPHEN 500 MG
1000 TABLET ORAL EVERY 8 HOURS PRN
Status: DISCONTINUED | OUTPATIENT
Start: 2023-09-12 | End: 2023-09-17 | Stop reason: HOSPADM

## 2023-09-12 RX ORDER — BISACODYL 5 MG/1
5 TABLET, DELAYED RELEASE ORAL DAILY PRN
Status: DISCONTINUED | OUTPATIENT
Start: 2023-09-12 | End: 2023-09-17 | Stop reason: HOSPADM

## 2023-09-12 RX ORDER — ROSUVASTATIN CALCIUM 10 MG/1
10 TABLET, COATED ORAL DAILY
Status: DISCONTINUED | OUTPATIENT
Start: 2023-09-13 | End: 2023-09-17 | Stop reason: HOSPADM

## 2023-09-12 RX ORDER — SODIUM CHLORIDE 0.9 % (FLUSH) 0.9 %
10 SYRINGE (ML) INJECTION AS NEEDED
Status: DISCONTINUED | OUTPATIENT
Start: 2023-09-12 | End: 2023-09-17 | Stop reason: HOSPADM

## 2023-09-12 RX ORDER — TAMSULOSIN HYDROCHLORIDE 0.4 MG/1
0.4 CAPSULE ORAL NIGHTLY
Status: DISCONTINUED | OUTPATIENT
Start: 2023-09-12 | End: 2023-09-17 | Stop reason: HOSPADM

## 2023-09-12 RX ORDER — AMOXICILLIN 250 MG
2 CAPSULE ORAL 2 TIMES DAILY
Status: DISCONTINUED | OUTPATIENT
Start: 2023-09-12 | End: 2023-09-17 | Stop reason: HOSPADM

## 2023-09-12 RX ORDER — PANTOPRAZOLE SODIUM 40 MG/1
40 TABLET, DELAYED RELEASE ORAL
Status: DISCONTINUED | OUTPATIENT
Start: 2023-09-12 | End: 2023-09-17 | Stop reason: HOSPADM

## 2023-09-12 RX ORDER — BISACODYL 10 MG
10 SUPPOSITORY, RECTAL RECTAL DAILY PRN
Status: DISCONTINUED | OUTPATIENT
Start: 2023-09-12 | End: 2023-09-17 | Stop reason: HOSPADM

## 2023-09-12 RX ORDER — SODIUM CHLORIDE 0.9 % (FLUSH) 0.9 %
10 SYRINGE (ML) INJECTION EVERY 12 HOURS SCHEDULED
Status: DISCONTINUED | OUTPATIENT
Start: 2023-09-12 | End: 2023-09-17 | Stop reason: HOSPADM

## 2023-09-12 RX ORDER — SODIUM CHLORIDE 9 MG/ML
40 INJECTION, SOLUTION INTRAVENOUS AS NEEDED
Status: DISCONTINUED | OUTPATIENT
Start: 2023-09-12 | End: 2023-09-17 | Stop reason: HOSPADM

## 2023-09-12 RX ORDER — POLYETHYLENE GLYCOL 3350 17 G/17G
17 POWDER, FOR SOLUTION ORAL DAILY PRN
Status: DISCONTINUED | OUTPATIENT
Start: 2023-09-12 | End: 2023-09-17 | Stop reason: HOSPADM

## 2023-09-12 RX ADMIN — TAMSULOSIN HYDROCHLORIDE 0.4 MG: 0.4 CAPSULE ORAL at 21:07

## 2023-09-12 RX ADMIN — Medication 10 ML: at 16:33

## 2023-09-12 RX ADMIN — PANTOPRAZOLE SODIUM 40 MG: 40 TABLET, DELAYED RELEASE ORAL at 16:33

## 2023-09-12 RX ADMIN — Medication 10 ML: at 21:08

## 2023-09-12 RX ADMIN — SENNOSIDES AND DOCUSATE SODIUM 2 TABLET: 50; 8.6 TABLET ORAL at 21:07

## 2023-09-12 RX ADMIN — IOPAMIDOL 85 ML: 612 INJECTION, SOLUTION INTRAVENOUS at 17:30

## 2023-09-12 NOTE — ED PROVIDER NOTES
I supervised care provided by the midlevel provider.   We have discussed this patient's history, physical exam, and treatment plan.  I have reviewed the note and personally saw and examined the patient and agree with the plan of care.   This is a gentleman who comes here with right leg pain and swelling.  Patient's daughter came in from out of town and saw the patient yesterday and noticed that it had increased swelling from the last time she sought so she decided to bring the patient to the emergency department.  Is important to note that this gentleman did have a hematoma in his thigh right thigh in January 2023.  He was stopped all blood thinning medicines at that time.  Patient denies any chest pain, shortness of breath.  He has no history of DVT or PE.  He is currently not on any anticoagulation.  History was obtained from the patient and the daughter.  Patient complains of no pain in that leg.  He has no new motor or sensory changes.  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  He reports no new fever or chills.  He denies any new trauma.  Patient saw Dr. Mai in June.  He recommended they continue to observe and watch and might need to get referred to a special orthopedic oncologist.  Because of this progression and no definitive work-up or plan daughter decided to bring the patient to the emergency department today.  GENERAL: not distressed elderly male.  No acute cardiovascular or respiratory distress.  HENT: nares patent  Head/neck/ face are symmetric without gross deformity or swelling  EYES: no scleral icterus  CV: regular rhythm, regular rate with intact distal pulses  RESPIRATORY: normal effort and no respiratory distress  ABDOMEN: soft and nontender  MUSCULOSKELETAL: This patient's anterior thigh he has swelling and a mass that is approximately the size of a tennis ball.  There is no erythema or bruising.  He has no tenderness with  palpation.  He is neurovascularly intact with intact distal pulses.  NEURO: alert and appropriate, moves all extremities, follows commands  SKIN: warm, dry    Vital signs and nursing notes reviewed.    Plan   ED Course as of 09/12/23 1914   Tue Sep 12, 2023   1053 INR(!): 1.33 [JG]   1053 Protime(!): 16.7 [JG]   1053 PTT(!): 36.7 [JG]   1053 BUN: 22 [JG]   1053 Creatinine: 1.06 [JG]   1053 Creatinine: 1.06 [JG]   1053 Sodium: 141 [JG]   1053 Potassium: 4.0 [JG]   1053 ALT (SGPT): 15 [JG]   1053 AST (SGOT): 27 [JG]   1053 WBC(!): 11.23 [JG]   1053 RBC(!): 3.98 [JG]   1053 Hemoglobin(!): 9.8 [JG]   1053 Hematocrit(!): 32.1 [JG]   1138 Per vascular tech, negative for DVT in RLE. Large 16 cm mass noted to right thigh with no obvious blood flow. [JG]   1154 Hemoglobin(!): 9.8  Previous hemoglobin 10 months ago was 13.6. [MM]   1305 I did speak with Dr. Bone who is the hospitalist on for [facility].  Explained to him the patient's presenting symptoms and results of the test.  CT scan has been ordered and has been completed.  He will follow-up with the results.  He agrees to admit the patient to the hospital.  All questions answered [MM]      ED Course User Index  [JG] Letty Fowler APRN  [MM] Freeman Juarez MD         This gentleman has likely a mass in his muscle.  I do not believe that the hematoma is the major cause of his increased swelling.  Our plan is to admit him to the hospital have orthopedic consult.  We are going to do a CT scan of his lower extremity to make sure that is not a hematoma.  He very well might need a repeat MRI.  He does have a drop in his hemoglobin which is very nonspecific.  His venous Doppler of his lower extremity is negative for a DVT and he is neurovascular intact.  Discussed this with the patient and daughter in the room.  All questions answered  Medical history reviewed:  When I reviewed the MRI from March 21, 2023 he has a complex lesion in the right vastus lateralis muscle with  enhancement and clear areas of hemorrhagic material both simple and intramuscular hematoma with typical inflammatory response or hemorrhage adjacent to a solid mass lesion could have this appearance..  Please see complete dictated report from radiologist.     Freeman Juarez MD  09/12/23 1914

## 2023-09-12 NOTE — PROGRESS NOTES
Ortho note:    Attempted to see the patient at approximately 5:55 PM on 9/12/2023. Unfortunately, the patient was off the floor for CT scan. I will attempt to see him tomorrow morning. Based on a review of imaging, there is a reasonable concern for intramuscular tumor as a differential for hematoma. I will request the assistance of Dr. Warren Vargas, who is expertise in musculoskeletal oncology given the concern as noted above.    Cole Sadler MD, PhD  Orthopaedic & Hand Surgery  [facility]  [phone] - [place] [facility]  [phone] - [facility]

## 2023-09-12 NOTE — H&P
Patient Name:  Enrique Gillette  YOB: 1930  MRN:  [id number]  Admit Date:  9/12/2023  Patient Care Team:  Nazanin Lucio MD as PCP - General (Family Medicine)  Sam Maravilla MD as Consulting Physician (Urology)  Martin Betancourt MD as Consulting Physician (Dermatology)  Wicho Andrade DC (Chiropractic Medicine)      Subjective   History Present Illness     Chief Complaint   Patient presents with    Leg Swelling         History of Present Illness  Patient is a 89-year-old male with a history of including but not limited to chronic back pain, hyperlipidemia skin cancer, presented to the ED complaining of worsening right leg pain and swelling.  Patient previously has history of right thigh hematoma and was on aspirin at home which was discontinued.  Patient was seen orthopedic surgery in July /2023.  Diarrhea, surveillance was  recommended with  further evaluation if swelling continues to worsen.  Patient's daughter at bedside assisted with history.  She was out of town and when she came back yesterday she noticed increasing swelling and decided to bring patient to the ED for further evaluation.  Patient denies trauma.,  Falls.  Denies fevers chills.  Hemoglobin was found to be low on admission at 9.8, down from 13.6 last year.  Patient denies any dark stools, hematuria.  Does take Aleve daily for history of chronic back pain.  Was on aspirin previously but has been discontinued due to hematoma        Review of Systems     GENERAL: No fevers, chills, sweats.    RESPIRATORY: No cough, shortness of breath, hemoptysis or wheezing.   CVS: No chest pain, palpitations, orthopnea, dyspnea on exertion   GI: No melena or hematochezia. No abdominal pain. No nausea, vomiting, constipation, diarrhea  MSK: Right thigh swelling, pain    Personal History     Past Medical History:   Diagnosis Date    Chest pain     Hyperlipidemia     Prostate CA     Prostate cancer     RBBB     Right knee pain     SOB  (shortness of breath)      Past Surgical History:   Procedure Laterality Date    CATARACT EXTRACTION Bilateral     COLONOSCOPY  2015     Family History   Problem Relation Age of Onset    No Known Problems Mother     No Known Problems Father     No Known Problems Brother     No Known Problems Brother     No Known Problems Brother     No Known Problems Brother      Social History     Tobacco Use    Smoking status: Never   Vaping Use    Vaping Use: Never used   Substance Use Topics    Alcohol use: No    Drug use: No     No current facility-administered medications on file prior to encounter.     Current Outpatient Medications on File Prior to Encounter   Medication Sig Dispense Refill    acetaminophen (TYLENOL) 500 MG tablet Take 1 tablet by mouth Every 6 (Six) Hours As Needed for Mild Pain.      NON FORMULARY Omega 3 joint pain      rosuvastatin (CRESTOR) 10 MG tablet TAKE 1 TABLET DAILY 90 tablet 3     No Known Allergies    Objective    Objective     Vital Signs  Temp:  [97 °F (36.1 °C)-97.4 °F (36.3 °C)] 97 °F (36.1 °C)  Heart Rate:  [] 80  Resp:  [16-17] 16  BP: (105-136)/(66-88) 125/69  SpO2:  [91 %-98 %] 96 %  on   ;   Device (Oxygen Therapy): room air  There is no height or weight on file to calculate BMI.    Physical Exam    General: Alert and oriented x3, no acute distress, elderly  HEENT: Normocephalic, atraumatic  Eyes: PERRL, EOMI, anicteric sclerae  Lungs: Clear to auscultation bilaterally, no crackles or wheezes  CV: Regular rate and rhythm, no murmurs rubs or gallops  Abdomen: Soft, nontender, nondistended.  Normoactive bowel sounds  Extremities: Large right thigh mass, tender to palpation,, warm to touch.  No erythema, no open lesions.  Right lower extremity edema,  Skin: Clean/dry/intact, no rashes  Neuro: Cranial nerves II through XII intact, no gross focal neurological deficits appreciated  Psych: Appropriate mood and affect      Results Review:  I reviewed the patient's new clinical  results.  I reviewed the patient's new imaging results and agree with the interpretation.  I reviewed the patient's other test results and agree with the interpretation  I personally viewed and interpreted the patient's EKG/Telemetry data  Discussed with ED provider.    Lab Results (last 24 hours)       Procedure Component Value Units Date/Time    Comprehensive Metabolic Panel [[id number]]  (Abnormal) Collected: 09/12/23 1021    Specimen: Blood Updated: 09/12/23 1053     Glucose 109 mg/dL      BUN 22 mg/dL      Creatinine 1.06 mg/dL      Sodium 141 mmol/L      Potassium 4.0 mmol/L      Chloride 109 mmol/L      CO2 22.0 mmol/L      Calcium 8.9 mg/dL      Total Protein 6.5 g/dL      Albumin 2.9 g/dL      ALT (SGPT) 15 U/L      AST (SGOT) 27 U/L      Alkaline Phosphatase 153 U/L      Total Bilirubin 0.5 mg/dL      Globulin 3.6 gm/dL      A/G Ratio 0.8 g/dL      BUN/Creatinine Ratio 20.8     Anion Gap 10.0 mmol/L      eGFR 65.4 mL/min/1.73     Narrative:      GFR Normal >60  Chronic Kidney Disease <60  Kidney Failure <15    The GFR formula is only valid for adults with stable renal function between ages 18 and 70.    Protime-INR [[id number]]  (Abnormal) Collected: 09/12/23 1021    Specimen: Blood Updated: 09/12/23 1053     Protime 16.7 Seconds      INR 1.33    aPTT [[id number]]  (Abnormal) Collected: 09/12/23 1021    Specimen: Blood Updated: 09/12/23 1053     PTT 36.7 seconds     CBC & Differential [[id number]]  (Abnormal) Collected: 09/12/23 1021    Specimen: Blood Updated: 09/12/23 1042    Narrative:      The following orders were created for panel order CBC & Differential.  Procedure                               Abnormality         Status                     ---------                               -----------         ------                     CBC Auto Differential[[id number]]        Abnormal            Final result                 Please view results for these tests on the individual orders.    CBC Auto Differential  [[id number]]  (Abnormal) Collected: 09/12/23 1021    Specimen: Blood Updated: 09/12/23 1042     WBC 11.23 10*3/mm3      RBC 3.98 10*6/mm3      Hemoglobin 9.8 g/dL      Hematocrit 32.1 %      MCV 80.7 fL      MCH 24.6 pg      MCHC 30.5 g/dL      RDW 14.9 %      RDW-SD 43.0 fl      MPV 10.7 fL      Platelets 302 10*3/mm3      Neutrophil % 81.7 %      Lymphocyte % 6.6 %      Monocyte % 8.5 %      Eosinophil % 2.4 %      Basophil % 0.4 %      Immature Grans % 0.4 %      Neutrophils, Absolute 9.17 10*3/mm3      Lymphocytes, Absolute 0.74 10*3/mm3      Monocytes, Absolute 0.96 10*3/mm3      Eosinophils, Absolute 0.27 10*3/mm3      Basophils, Absolute 0.04 10*3/mm3      Immature Grans, Absolute 0.05 10*3/mm3      nRBC 0.0 /100 WBC     Iron Profile [[id number]]  (Abnormal) Collected: 09/12/23 1021    Specimen: Blood Updated: 09/12/23 1327     Iron 22 mcg/dL      Iron Saturation (TSAT) 8 %      Transferrin 178 mg/dL      TIBC 265 mcg/dL     Ferritin [[id number]]  (Abnormal) Collected: 09/12/23 1021    Specimen: Blood Updated: 09/12/23 1331     Ferritin 466.00 ng/mL     Narrative:      Results may be falsely decreased if patient taking Biotin.              Imaging Results (Last 24 Hours)       Procedure Component Value Units Date/Time    CT Lower Extremity Right With Contrast [[id number]] Resulted: 09/12/23 1726     Updated: 09/12/23 1726                No orders to display        Assessment/Plan     Active Hospital Problems    Diagnosis  POA    **Mass of right lower extremity [R22.41]  Yes    HLD (hyperlipidemia) [E78.5]  Unknown    Chronic back pain [M54.9, G89.29]  Unknown    Anemia [D64.9]  Unknown      Resolved Hospital Problems   No resolved problems to display.     Patient is a 89-year-old male with a history of including but not limited to chronic back pain, hyperlipidemia skin cancer, presented to the ED complaining of worsening right leg pain and swelling.      Right  thigh mass: Patient with significant mass/swelling  on exam.  Tender to palpation.  No signs of infection.  CT of the right lower extremity pending for further evaluation, duplex ultrasound to evaluate for possible DVT obtained which was negative for DVT.  Orthopedic surgery consulted.      Anemia: Hemoglobin 9.8 on admission, down from 13.6 previously.  Patient denies any signs of bleeding.  Does take Aleve daily for back pain, however denies abdominal pain, nausea or vomiting.  Iron panel Imburgia consistent with anemia of chronic disease, with iron saturation of 8 might have mild iron deficiency anemia as well.  Concerning for neoplasm secondary to above findings, and cancer associated anemia of chronic disease.  Although low suspicion for GI bleed, did start on PPI for GI protection in the setting of daily NSAID use.      Spinal stenosis of lumbar region/chronic back pain: Initiated on scheduled Tylenol, as needed oxycodone for moderate pain.  Bowel regimen.      Hyperlipidemia: Resume home statin        I discussed the patient's findings and my recommendations with patient, family, and ED provider.    VTE Prophylaxis - SCDs.  Code Status - Full code.       Candida Bone MD  [facility]  09/12/23  17:29 EDT

## 2023-09-12 NOTE — PROGRESS NOTES
Discharge Planning Assessment  [facility]     Patient Name: Enrique Gillette  MRN: [id number]  Today's Date: 9/12/2023    Admit Date: 9/12/2023        Discharge Needs Assessment    No documentation.                  Discharge Plan       Row Name 09/12/23 1653       Plan    Plan Comments The patient transferred to [facility] from ER on 9/12/23. CCP will screen and follow for any needs that may arise. LOGAN Ramirez RN, CCP.                  Continued Care and Services - Admitted Since 9/12/2023    Coordination has not been started for this encounter.       Expected Discharge Date and Time       Expected Discharge Date Expected Discharge Time    Sep 18, 2023            Demographic Summary    No documentation.                  Functional Status    No documentation.                  Psychosocial    No documentation.                  Abuse/Neglect    No documentation.                  Legal    No documentation.                  Substance Abuse    No documentation.                  Patient Forms    No documentation.                     Maryam Ramirez RN

## 2023-09-12 NOTE — ED PROVIDER NOTES
EMERGENCY DEPARTMENT ENCOUNTER    Room Number:  17/17  Date seen:  9/12/2023  PCP: Nazanin Lucio MD  Historian/Independent historian: daughter  Chronic or social conditions impacting care: age      HPI:  Chief Complaint: leg pain  A complete HPI/ROS/PMH/PSH/SH/FH are unobtainable due to: n/a  Context: Enrique Gillette is a 89 y.o. male who presents to the ED c/o right leg pain and swelling.  Per the patient's daughter at bedside she states that in January the patient noticed a golf ball sized bump in his thigh.  At that time he had an MRI and was told that he had a mass in his right leg with a small hematoma and they recommended him discontinue aspirin.  She states that he has not been taking aspirin since February.  The area has continued to increase in size.  He states that it is painful at times.  He is also having swelling down into his calf.  No known injury, trauma.  No associated chest pain, shortness of breath.  No history of PE/DVT.  He is not on any hormone use.  No recent travel.    External Medical record review:   5/11/2023: office visit for mass of right lower ext  ASSESSMENT:       Diagnoses and all orders for this visit:     1. Hyperlipidemia, unspecified hyperlipidemia type (Primary)     2. Arthritis of right knee     3. Spinal stenosis of lumbar region, unspecified whether neurogenic claudication present     4. Mass of right lower extremity  -     Ambulatory Referral to Orthopedic Surgery     5. Urinary incontinence, unspecified type  -     Ambulatory Referral to Urology           PLAN: He is referred to orthopedic surgeon and urology for his current problems.  Healthy lifestyle discussed.  He will call if any problems.    1/31/2023: MRI of right femur  Narrative & Impression   RIGHT THIGH MRI WITH AND WITHOUT CONTRAST     HISTORY: 89-year-old man with apparent acute onset of thigh swelling in  January.     TECHNIQUE: Right thigh MRI was performed before and after IV  administration of 16 mL  of MultiHance contrast and is correlated with  the family medicine clinic note 01/31/2023.     FINDINGS: There is normal marrow signal in the visualized femur and  lower pelvis. No inguinal lymphadenopathy.     A lesion within the substance of the vastus lateralis muscle is centered  at the level of the proximal femoral shaft measuring 7.8 x 3.6 cm axial  and 10.2 cm long. The middle and caudad portions of the lesion contain  multiple fluid fluid levels and along the more proximal end of the  lesion there are heterogeneous signal material areas of mixed T1 and T2  signal and substantial thick areas of enhancing tissue. There is  enhancement around the periphery of the lesion along its middle and  distal portions as well. There is some poorly demarcated edema  enhancement extending into the muscle proximal to the lesion and on the  axial postcontrast images, the periphery of the lesion appears poorly  circumscribed.     Other musculature of the thigh appears normal.     IMPRESSION:  Complex lesion in the right vastus lateralis muscle with  enhancement and clear areas of hemorrhagic material. Both simple  intramuscular hematoma with typical inflammatory response or hemorrhage  adjacent to a solid mass lesion could have this appearance.     This report was finalized on 3/22/2023 9:16 AM by Dr. Victor Manuel Son M.D.         PAST MEDICAL HISTORY  Active Ambulatory Problems     Diagnosis Date Noted    Stenosis, spinal, lumbar 04/07/2016    Arthritis of right knee 05/21/2018     Resolved Ambulatory Problems     Diagnosis Date Noted    No Resolved Ambulatory Problems     Past Medical History:   Diagnosis Date    Chest pain     Hyperlipidemia     Prostate CA     Prostate cancer     RBBB     Right knee pain     SOB (shortness of breath)          PAST SURGICAL HISTORY  Past Surgical History:   Procedure Laterality Date    CATARACT EXTRACTION Bilateral     COLONOSCOPY  2015         FAMILY HISTORY  Family History   Problem  Relation Age of Onset    No Known Problems Mother     No Known Problems Father     No Known Problems Brother     No Known Problems Brother     No Known Problems Brother     No Known Problems Brother          SOCIAL HISTORY  Social History     Socioeconomic History    Marital status:    Tobacco Use    Smoking status: Never   Substance and Sexual Activity    Alcohol use: No    Drug use: No         ALLERGIES  Patient has no known allergies.        REVIEW OF SYSTEMS  Per HPI, otherwise negative.       PHYSICAL EXAM  ED Triage Vitals   Temp Heart Rate Resp BP SpO2   09/12/23 1001 09/12/23 1001 09/12/23 1001 09/12/23 1003 09/12/23 1001   97.4 °F (36.3 °C) 107 17 111/66 97 %      Temp src Heart Rate Source Patient Position BP Location FiO2 (%)   -- -- -- -- --              Physical Exam  Vitals and nursing note reviewed.   Constitutional:       Appearance: Normal appearance.   HENT:      Head: Normocephalic and atraumatic.      Mouth/Throat:      Mouth: Mucous membranes are moist.   Eyes:      Conjunctiva/sclera: Conjunctivae normal.   Cardiovascular:      Rate and Rhythm: Regular rhythm. Tachycardia present.      Pulses: Normal pulses.      Heart sounds: Normal heart sounds.   Pulmonary:      Effort: Pulmonary effort is normal.      Breath sounds: Normal breath sounds. No wheezing, rhonchi or rales.   Musculoskeletal:         General: Swelling (right thigh mass) present. Normal range of motion.      Right lower leg: Edema present.      Left lower leg: No edema.      Comments: Large, palpable mass noted to right anterior thigh that is warm and tender on palpation. No skin changes. Distal pulses palpable, sensation intact to light touch. FROM. No bony tenderness.    Skin:     General: Skin is warm.      Capillary Refill: Capillary refill takes less than 2 seconds.      Coloration: Skin is pale.   Neurological:      Mental Status: He is alert and oriented to person, place, and time. Mental status is at baseline.    Psychiatric:         Mood and Affect: Mood normal.             LAB RESULTS  Recent Results (from the past 24 hour(s))   Comprehensive Metabolic Panel    Collection Time: 09/12/23 10:21 AM    Specimen: Blood   Result Value Ref Range    Glucose 109 (H) 65 - 99 mg/dL    BUN 22 8 - 23 mg/dL    Creatinine 1.06 0.76 - 1.27 mg/dL    Sodium 141 136 - 145 mmol/L    Potassium 4.0 3.5 - 5.2 mmol/L    Chloride 109 (H) 98 - 107 mmol/L    CO2 22.0 22.0 - 29.0 mmol/L    Calcium 8.9 8.2 - 9.6 mg/dL    Total Protein 6.5 6.0 - 8.5 g/dL    Albumin 2.9 (L) 3.5 - 5.2 g/dL    ALT (SGPT) 15 1 - 41 U/L    AST (SGOT) 27 1 - 40 U/L    Alkaline Phosphatase 153 (H) 39 - 117 U/L    Total Bilirubin 0.5 0.0 - 1.2 mg/dL    Globulin 3.6 gm/dL    A/G Ratio 0.8 g/dL    BUN/Creatinine Ratio 20.8 7.0 - 25.0    Anion Gap 10.0 5.0 - 15.0 mmol/L    eGFR 65.4 >60.0 mL/min/1.73   Protime-INR    Collection Time: 09/12/23 10:21 AM    Specimen: Blood   Result Value Ref Range    Protime 16.7 (H) 11.7 - 14.2 Seconds    INR 1.33 (H) 0.90 - 1.10   aPTT    Collection Time: 09/12/23 10:21 AM    Specimen: Blood   Result Value Ref Range    PTT 36.7 (H) 22.7 - 35.4 seconds   CBC Auto Differential    Collection Time: 09/12/23 10:21 AM    Specimen: Blood   Result Value Ref Range    WBC 11.23 (H) 3.40 - 10.80 10*3/mm3    RBC 3.98 (L) 4.14 - 5.80 10*6/mm3    Hemoglobin 9.8 (L) 13.0 - 17.7 g/dL    Hematocrit 32.1 (L) 37.5 - 51.0 %    MCV 80.7 79.0 - 97.0 fL    MCH 24.6 (L) 26.6 - 33.0 pg    MCHC 30.5 (L) 31.5 - 35.7 g/dL    RDW 14.9 12.3 - 15.4 %    RDW-SD 43.0 37.0 - 54.0 fl    MPV 10.7 6.0 - 12.0 fL    Platelets 302 140 - 450 10*3/mm3    Neutrophil % 81.7 (H) 42.7 - 76.0 %    Lymphocyte % 6.6 (L) 19.6 - 45.3 %    Monocyte % 8.5 5.0 - 12.0 %    Eosinophil % 2.4 0.3 - 6.2 %    Basophil % 0.4 0.0 - 1.5 %    Immature Grans % 0.4 0.0 - 0.5 %    Neutrophils, Absolute 9.17 (H) 1.70 - 7.00 10*3/mm3    Lymphocytes, Absolute 0.74 0.70 - 3.10 10*3/mm3    Monocytes, Absolute 0.96  (H) 0.10 - 0.90 10*3/mm3    Eosinophils, Absolute 0.27 0.00 - 0.40 10*3/mm3    Basophils, Absolute 0.04 0.00 - 0.20 10*3/mm3    Immature Grans, Absolute 0.05 0.00 - 0.05 10*3/mm3    nRBC 0.0 0.0 - 0.2 /100 WBC   Iron Profile    Collection Time: 09/12/23 10:21 AM    Specimen: Blood   Result Value Ref Range    Iron 22 (L) 59 - 158 mcg/dL    Iron Saturation (TSAT) 8 (L) 20 - 50 %    Transferrin 178 (L) 200 - 360 mg/dL    TIBC 265 (L) 298 - 536 mcg/dL   Ferritin    Collection Time: 09/12/23 10:21 AM    Specimen: Blood   Result Value Ref Range    Ferritin 466.00 (H) 30.00 - 400.00 ng/mL   Duplex Venous Lower Extremity - Right CAR    Collection Time: 09/12/23 11:35 AM   Result Value Ref Range    Right Common Femoral Spont Y     Right Common Femoral Competent Y     Right Common Femoral Phasic Y     Right Common Femoral Compress C     Right Common Femoral Augment Y     Right Saphenofemoral Junction Compress C     Right Profunda Femoral Compress C     Right Proximal Femoral Compress C     Right Mid Femoral Spont Y     Right Mid Femoral Competent Y     Right Mid Femoral Phasic Y     Right Mid Femoral Compress C     Right Mid Femoral Augment Y     Right Distal Femoral Compress C     Right Popliteal Spont Y     Right Popliteal Competent Y     Right Popliteal Phasic Y     Right Popliteal Compress C     Right Popliteal Augment Y     Right Posterior Tibial Compress C     Right Peroneal Compress C     Right Gastronemius Compress C     Right Greater Saph AK Compress C     Right Greater Saph BK Compress C     Right Lesser Saph Compress C     Left Common Femoral Spont Y     Left Common Femoral Competent Y     Left Common Femoral Phasic Y     Left Common Femoral Compress C     Left Common Femoral Augment Y     BH CV VAS PRELIMINARY FINDINGS SCRIPTING 1.0    Type & Screen    Collection Time: 09/12/23 12:10 PM    Specimen: Blood   Result Value Ref Range    ABO Type A     RH type Positive     Antibody Screen Negative     T&S Expiration  Date 9/15/2023 11:59:59 PM        Ordered the above labs and reviewed the results.        RADIOLOGY  No Radiology Exams Resulted Within Past 24 Hours    Ordered the above noted radiological studies. Reviewed by me in PACS.        MEDICATIONS GIVEN IN ER  Medications   rosuvastatin (CRESTOR) tablet 10 mg (has no administration in time range)   acetaminophen (TYLENOL) tablet 1,000 mg (has no administration in time range)   tamsulosin (FLOMAX) 24 hr capsule 0.4 mg (has no administration in time range)   sodium chloride 0.9 % flush 10 mL (has no administration in time range)   sodium chloride 0.9 % flush 10 mL (has no administration in time range)   sodium chloride 0.9 % infusion 40 mL (has no administration in time range)   sennosides-docusate (PERICOLACE) 8.6-50 MG per tablet 2 tablet (has no administration in time range)     And   polyethylene glycol (MIRALAX) packet 17 g (has no administration in time range)     And   bisacodyl (DULCOLAX) EC tablet 5 mg (has no administration in time range)     And   bisacodyl (DULCOLAX) suppository 10 mg (has no administration in time range)   pantoprazole (PROTONIX) EC tablet 40 mg (has no administration in time range)           MEDICAL DECISION MAKING, PROGRESS, and CONSULTS    All labs have been independently reviewed by me.  All radiology studies have been reviewed by me and I have also reviewed the radiology report.   EKG's independently viewed and interpreted by me.  Discussion below represents my analysis of pertinent findings related to patient's condition, differential diagnosis, treatment plan and final disposition.    89-year-old male who presents with increased pain and swelling to right thigh.  Labs remarkable for acute anemia.  Patient will be admitted for CT of the right lower extremity for further evaluation as this mass has increased in size since January despite being taken off aspirin.          Shared decision making/consideration for admission:   admitted      Orders placed during this visit:  Orders Placed This Encounter   Procedures    CT Lower Extremity Right With Contrast    Comprehensive Metabolic Panel    Protime-INR    aPTT    CBC Auto Differential    Iron Profile    Ferritin    Basic Metabolic Panel    CBC (No Diff)    Magnesium    Phosphorus    Diet: Regular/House Diet; Texture: Regular Texture (IDDSI 7); Fluid Consistency: Thin (IDDSI 0)    Vital Signs    Notify Physician (With Default Parameters)    Up with assistance    Intake & Output    Weigh patient    Daily Weights    Oral Care    Saline Lock & Maintain IV Access    Place Sequential Compression Device    Maintain Sequential Compression Device    Code Status and Medical Interventions:    [facility]HA (on-call MD unless specified) Details    Inpatient Orthopedic Surgery Consult    Type & Screen    Insert Peripheral IV    Inpatient Admission    CBC & Differential         Differential diagnosis include, but not limited to: DVT, malignancy, mass, hematoma, cellulitis       Additional orders considered but not ordered: MRI lower ext    Independent interpretation of labs, radiology studies, and discussions with consultants:    Discussed with Dr. Juarez, who agrees with plan.     ED Course as of 09/12/23 1351   Tue Sep 12, 2023   1053 INR(!): 1.33 [JG]   1053 Protime(!): 16.7 [JG]   1053 PTT(!): 36.7 [JG]   1053 BUN: 22 [JG]   1053 Creatinine: 1.06 [JG]   1053 Creatinine: 1.06 [JG]   1053 Sodium: 141 [JG]   1053 Potassium: 4.0 [JG]   1053 ALT (SGPT): 15 [JG]   1053 AST (SGOT): 27 [JG]   1053 WBC(!): 11.23 [JG]   1053 RBC(!): 3.98 [JG]   1053 Hemoglobin(!): 9.8 [JG]   1053 Hematocrit(!): 32.1 [JG]   1138 Per vascular tech, negative for DVT in RLE. Large 16 cm mass noted to right thigh with no obvious blood flow. [JG]   1154 Hemoglobin(!): 9.8  Previous hemoglobin 10 months ago was 13.6. [MM]   1305 I did speak with Dr. Bone who is the hospitalist on for A.  Explained to him the patient's presenting  symptoms and results of the test.  CT scan has been ordered and has been completed.  He will follow-up with the results.  He agrees to admit the patient to the hospital.  All questions answered [MM]      ED Course User Index  [JG] Letty Fowler APRN  [MM] Freeman Juarez MD             DIAGNOSIS  Final diagnoses:   Mass of right lower extremity   Acute anemia         DISPOSITION  admit        Latest Documented Vital Signs:  As of 13:51 EDT  BP- 134/80 HR- 84 Temp- 97.4 °F (36.3 °C) O2 sat- 91%              --    Please note that portions of this were completed with a voice recognition program.       Note Disclaimer: At [facility], we believe that sharing information builds trust and better relationships. You are receiving this note because you are receiving care at [facility] or recently visited. It is possible you will see health information before a provider has talked with you about it. This kind of information can be easy to misunderstand. To help you fully understand what it means for your health, we urge you to discuss this note with your provider.             Letty Fowler APRN  09/12/23 1926

## 2023-09-12 NOTE — ED NOTES
Nursing report ED to floor  Enrique Gillette  89 y.o.  male    HPI :   Chief Complaint   Patient presents with    Leg Swelling       Admitting doctor:   Candida Bone MD    Admitting diagnosis:   The primary encounter diagnosis was Mass of right lower extremity. A diagnosis of Acute anemia was also pertinent to this visit.    Code status:   Current Code Status       Date Active Code Status Order ID Comments User Context       Not on file            Allergies:   Patient has no known allergies.    Isolation:   No active isolations    Intake and Output  No intake or output data in the 24 hours ending 09/12/23 1318    Weight:   There were no vitals filed for this visit.    Most recent vitals:   Vitals:    09/12/23 1310 09/12/23 1312 09/12/23 1313 09/12/23 1314   BP:       Pulse: 88 84 69 75   Resp:       Temp:       SpO2: 95% 95% 98% 97%       Active LDAs/IV Access:   Lines, Drains & Airways       Active LDAs       None                    Labs (abnormal labs have a star):   Labs Reviewed   COMPREHENSIVE METABOLIC PANEL - Abnormal; Notable for the following components:       Result Value    Glucose 109 (*)     Chloride 109 (*)     Albumin 2.9 (*)     Alkaline Phosphatase 153 (*)     All other components within normal limits    Narrative:     GFR Normal >60  Chronic Kidney Disease <60  Kidney Failure <15    The GFR formula is only valid for adults with stable renal function between ages 18 and 70.   PROTIME-INR - Abnormal; Notable for the following components:    Protime 16.7 (*)     INR 1.33 (*)     All other components within normal limits   APTT - Abnormal; Notable for the following components:    PTT 36.7 (*)     All other components within normal limits   CBC WITH AUTO DIFFERENTIAL - Abnormal; Notable for the following components:    WBC 11.23 (*)     RBC 3.98 (*)     Hemoglobin 9.8 (*)     Hematocrit 32.1 (*)     MCH 24.6 (*)     MCHC 30.5 (*)     Neutrophil % 81.7 (*)     Lymphocyte % 6.6 (*)     Neutrophils,  Absolute 9.17 (*)     Monocytes, Absolute 0.96 (*)     All other components within normal limits   IRON PROFILE   FERRITIN   TYPE AND SCREEN   CBC AND DIFFERENTIAL    Narrative:     The following orders were created for panel order CBC & Differential.  Procedure                               Abnormality         Status                     ---------                               -----------         ------                     CBC Auto Differential[[id number]]        Abnormal            Final result                 Please view results for these tests on the individual orders.       EKG:   No orders to display       Meds given in ED:   Medications - No data to display    Imaging results:  No radiology results for the last day    Ambulatory status:   Bed, or ax2 with walker    Social issues:   Social History     Socioeconomic History    Marital status:    Tobacco Use    Smoking status: Never   Substance and Sexual Activity    Alcohol use: No    Drug use: No       NIH Stroke Scale:       Registered Nurse, RN  09/12/23 13:18 EDT

## 2023-09-12 NOTE — PLAN OF CARE
Goal Outcome Evaluation:  Plan of Care Reviewed With: patient, daughter           Outcome Evaluation: Pt is alert and oriented x4. [place]. Up with stand by assist with rollator. Ortho consulted. Pt down for CT at this time. VSS. Daughter at bedside. Will cont plan of care.

## 2023-09-13 LAB
ANION GAP SERPL CALCULATED.3IONS-SCNC: 6.9 MMOL/L (ref 5–15)
BUN SERPL-MCNC: 19 MG/DL (ref 8–23)
BUN/CREAT SERPL: 19 (ref 7–25)
CALCIUM SPEC-SCNC: 8.2 MG/DL (ref 8.2–9.6)
CHLORIDE SERPL-SCNC: 110 MMOL/L (ref 98–107)
CO2 SERPL-SCNC: 22.1 MMOL/L (ref 22–29)
CREAT SERPL-MCNC: 1 MG/DL (ref 0.76–1.27)
DEPRECATED RDW RBC AUTO: 45 FL (ref 37–54)
EGFRCR SERPLBLD CKD-EPI 2021: 70.2 ML/MIN/1.73
ERYTHROCYTE [DISTWIDTH] IN BLOOD BY AUTOMATED COUNT: 14.9 % (ref 12.3–15.4)
GLUCOSE SERPL-MCNC: 93 MG/DL (ref 65–99)
HCT VFR BLD AUTO: 30.4 % (ref 37.5–51)
HCT VFR BLD AUTO: 31 % (ref 37.5–51)
HGB BLD-MCNC: 9.3 G/DL (ref 13–17.7)
HGB BLD-MCNC: 9.4 G/DL (ref 13–17.7)
MAGNESIUM SERPL-MCNC: 2 MG/DL (ref 1.7–2.3)
MCH RBC QN AUTO: 24.9 PG (ref 26.6–33)
MCHC RBC AUTO-ENTMCNC: 30 G/DL (ref 31.5–35.7)
MCV RBC AUTO: 82.9 FL (ref 79–97)
PHOSPHATE SERPL-MCNC: 3.1 MG/DL (ref 2.5–4.5)
PLATELET # BLD AUTO: 300 10*3/MM3 (ref 140–450)
PMV BLD AUTO: 10.7 FL (ref 6–12)
POTASSIUM SERPL-SCNC: 4.2 MMOL/L (ref 3.5–5.2)
RBC # BLD AUTO: 3.74 10*6/MM3 (ref 4.14–5.8)
SODIUM SERPL-SCNC: 139 MMOL/L (ref 136–145)
WBC NRBC COR # BLD: 10.66 10*3/MM3 (ref 3.4–10.8)

## 2023-09-13 PROCEDURE — 84100 ASSAY OF PHOSPHORUS: CPT | Performed by: STUDENT IN AN ORGANIZED HEALTH CARE EDUCATION/TRAINING PROGRAM

## 2023-09-13 PROCEDURE — 85027 COMPLETE CBC AUTOMATED: CPT | Performed by: STUDENT IN AN ORGANIZED HEALTH CARE EDUCATION/TRAINING PROGRAM

## 2023-09-13 PROCEDURE — 80048 BASIC METABOLIC PNL TOTAL CA: CPT | Performed by: STUDENT IN AN ORGANIZED HEALTH CARE EDUCATION/TRAINING PROGRAM

## 2023-09-13 PROCEDURE — 83735 ASSAY OF MAGNESIUM: CPT | Performed by: STUDENT IN AN ORGANIZED HEALTH CARE EDUCATION/TRAINING PROGRAM

## 2023-09-13 PROCEDURE — 85018 HEMOGLOBIN: CPT | Performed by: STUDENT IN AN ORGANIZED HEALTH CARE EDUCATION/TRAINING PROGRAM

## 2023-09-13 PROCEDURE — 85014 HEMATOCRIT: CPT | Performed by: STUDENT IN AN ORGANIZED HEALTH CARE EDUCATION/TRAINING PROGRAM

## 2023-09-13 RX ORDER — TRAZODONE HYDROCHLORIDE 50 MG/1
25 TABLET ORAL NIGHTLY PRN
Status: DISCONTINUED | OUTPATIENT
Start: 2023-09-13 | End: 2023-09-17 | Stop reason: HOSPADM

## 2023-09-13 RX ADMIN — TRAZODONE HYDROCHLORIDE 25 MG: 50 TABLET ORAL at 21:49

## 2023-09-13 RX ADMIN — PANTOPRAZOLE SODIUM 40 MG: 40 TABLET, DELAYED RELEASE ORAL at 06:45

## 2023-09-13 RX ADMIN — ROSUVASTATIN CALCIUM 10 MG: 10 TABLET, FILM COATED ORAL at 08:51

## 2023-09-13 RX ADMIN — SENNOSIDES AND DOCUSATE SODIUM 2 TABLET: 50; 8.6 TABLET ORAL at 08:51

## 2023-09-13 RX ADMIN — Medication 10 ML: at 20:44

## 2023-09-13 RX ADMIN — TAMSULOSIN HYDROCHLORIDE 0.4 MG: 0.4 CAPSULE ORAL at 20:44

## 2023-09-13 RX ADMIN — Medication 10 ML: at 08:55

## 2023-09-13 NOTE — DISCHARGE PLACEMENT REQUEST
"Enrique Hamilton E \"Barry\" (89 y.o. Male)       Date of Birth   08/20/1930    Social Security Number       Address   [address] [place] [facility] [address]    Home Phone   [phone]    MRN   [id number]       Nondenominational   [employer]    Marital Status                               Admission Date   9/12/23    Admission Type   Emergency    Admitting Provider   Candida Bone MD    Attending Provider   Candida Bone MD    Department, Room/Bed   [address], P490/1       Discharge Date       Discharge Disposition       Discharge Destination                                 Attending Provider: Candida Bone MD    Allergies: No Known Allergies    Isolation: None   Infection: None   Code Status: CPR    Ht: 171.5 cm (67.52\")   Wt: 77.6 kg (171 lb 1.2 oz)    Admission Cmt: None   Principal Problem: Mass of right lower extremity [R22.41]                   Active Insurance as of 9/12/2023       Primary Coverage       Payor Plan Insurance Group Employer/Plan Group    [facility] MEDICARE REPLACEMENT [facility] MEDICARE REPLACEMENT [id number]       Payor Plan Address Payor Plan Phone Number Payor Plan Fax Number Effective Dates    [address] [address]   1/1/2023 - None Entered    [facility] [id number]         Subscriber Name Subscriber Birth Date Member ID       ENRIQUE HAMILTON 8/20/1930 [id number]                     Emergency Contacts        (Rel.) Home Phone Work Phone Mobile Phone    Pauline Hoguetie (Daughter) -- -- [phone]    Jay Domingo (Spouse) [phone] -- --                "

## 2023-09-13 NOTE — PLAN OF CARE
Goal Outcome Evaluation:  Plan of Care Reviewed With: patient        Progress: no change   Patient is alert and oriented times four. He is very Hard of Hearing. Ambulates to the restroom with assistance of one and his rolling walker. No complaints of pain or discomfort.

## 2023-09-13 NOTE — PLAN OF CARE
Goal Outcome Evaluation:  Plan of Care Reviewed With: patient, daughter, son        Progress: no change  Outcome Evaluation: Pt is alert and oriented x4. [place]. Up with stand by assist w his rollator. CT completed today. Trazadone ordered prn for sleep. SCDs ordered instead of lovenox per MD. No complaints of pain. VSS. Will cont plan of care.

## 2023-09-13 NOTE — PROGRESS NOTES
Name: Enrique Gillette ADMIT: 2023   : 1930  PCP: Nazanin Lucio MD    MRN: [id number] LOS: 1 days   AGE/SEX: 89 y.o. male  ROOM: [facility]     Subjective   Subjective   No acute events overnight.  Patient reports he does not sleep well, asking something to help with sleep, and reports melatonin does not help.  Anxious about the possible surgery.  Denies right lower extremity pain    Review of Systems     Objective   Objective   Vital Signs  Temp:  [97 °F (36.1 °C)-97.2 °F (36.2 °C)] 97.1 °F (36.2 °C)  Heart Rate:  [69-95] 86  Resp:  [16-18] 16  BP: ()/(54-88) 103/61  SpO2:  [91 %-98 %] 93 %  on   ;   Device (Oxygen Therapy): room air  Body mass index is 26.38 kg/m².  Physical Exam    General: Alert, laying in bed, not in distress, elderly  HEENT: Normocephalic, atraumatic  CV: Regular rate and rhythm, no murmurs rubs or gallops  Lungs: Clear to auscultation bilaterally, no crackles or wheezes  Abdomen: Soft, nontender, nondistended  Extremities: Large right thigh swelling/mass, right lower extremity edema      Results Review     I reviewed the patient's new clinical results.  Results from last 7 days   Lab Units 23  0714 23  1021   WBC 10*3/mm3 10.66 11.23*   HEMOGLOBIN g/dL 9.3* 9.8*   PLATELETS 10*3/mm3 300 302     Results from last 7 days   Lab Units 23  0714 23  1021   SODIUM mmol/L 139 141   POTASSIUM mmol/L 4.2 4.0   CHLORIDE mmol/L 110* 109*   CO2 mmol/L 22.1 22.0   BUN mg/dL 19 22   CREATININE mg/dL 1.00 1.06   GLUCOSE mg/dL 93 109*   Estimated Creatinine Clearance: 50.7 mL/min (by C-G formula based on SCr of 1 mg/dL).  Results from last 7 days   Lab Units 23  1021   ALBUMIN g/dL 2.9*   BILIRUBIN mg/dL 0.5   ALK PHOS U/L 153*   AST (SGOT) U/L 27   ALT (SGPT) U/L 15     Results from last 7 days   Lab Units 23  0714 23  1021   CALCIUM mg/dL 8.2 8.9   ALBUMIN g/dL  --  2.9*   MAGNESIUM mg/dL 2.0  --    PHOSPHORUS mg/dL 3.1  --      No results  found for: COVID19  No results found for: HGBA1C, POCGLU        CT Lower Extremity Right With Contrast  Narrative: RIGHT THIGH CT WITH CONTRAST     HISTORY: Follow-up enlarging soft tissue mass in the thigh. MRI in March 2023 demonstrated an 8 x 4 x 10 cm long intramuscular right thigh lesion  with hemorrhagic components. On the MRI, differential considerations of  hematoma with adjacent inflammatory response versus solid mass lesion  with hemorrhage were provided.     TECHNIQUE: Right thigh CT with 85 mL of Isovue-300 IV contrast is  provided and correlated with the MRI from 03/21/2023. Radiation dose  reduction techniques were utilized, including automated exposure control  and exposure modulation based on body size.        FINDINGS: The fluid component of the right thigh lesion has  substantially increased in size in the interval since the prior exam.  The lesion measures 19 cm long and 8 x 11 cm axial and is positioned  within the vastus lateralis muscle. Most of the volume of the lesion  comprises fluid signal. There is a rind of intermediate density,  possibly enhancing material around the periphery of the lesion measuring  up to about 12 mm thick. This is most pronounced along the proximal and  anterior portions of the lesion.     No lymphadenopathy. Anterior thigh musculature is otherwise preserved.  There is fatty atrophic change of the semimembranosus muscle and  partially along the biceps femoris. No joint effusion at the knee or the  hip. No bone lesion. Arthritis at the hip.     Impression: Compared with MRI 03/21/2023, there has been significant  enlargement in the intramuscular anterolateral right thigh lesion in the  vastus lateralis muscle with dimensions as above. The enlargement  appears to be primarily due to increased volume of fluid within the  lesion. An irregular rind of thickened tissue around the periphery of  the lesion is again observed and as stated previously, differential of  chronic  hematoma versus hemorrhagic solid/cystic tumor remains.     This report was finalized on 9/12/2023 6:52 PM by Dr. Victor Manuel Son M.D.     Duplex Venous Lower Extremity - Right CAR    Normal right lower extremity venous duplex scan.    Heterogeneous, predominantly hypoechoic collection anterior right thigh   measuring 17 x 9 cm, possibly consistent with hematoma.    Scheduled Medications  pantoprazole, 40 mg, Oral, Q AM  rosuvastatin, 10 mg, Oral, Daily  senna-docusate sodium, 2 tablet, Oral, BID  sodium chloride, 10 mL, Intravenous, Q12H  tamsulosin, 0.4 mg, Oral, Nightly    Infusions   Diet  Diet: Regular/House Diet; Texture: Regular Texture (IDDSI 7); Fluid Consistency: Thin (IDDSI 0)    I have personally reviewed     x   Laboratory     Microbiology     Radiology     EKG/Telemetry    Cardiology/Vascular     Pathology      Records       Assessment/Plan     Active Hospital Problems    Diagnosis  POA    **Mass of right lower extremity [R22.41]  Yes    HLD (hyperlipidemia) [E78.5]  Unknown    Chronic back pain [M54.9, G89.29]  Unknown    Anemia [D64.9]  Unknown      Resolved Hospital Problems   No resolved problems to display.           Patient is a 89-year-old male with a history of including but not limited to chronic back pain, hyperlipidemia skin cancer, presented to the ED complaining of worsening right leg pain and swelling.       Right  thigh mass: Patient with significant mass/swelling on exam.  Tender to palpation.  No signs of infection.  Duplex ultrasound negative for DVT.  - CT scan of the right lower extremity significant enlargement in the intramuscular anterolateral right thigh lesion in the vastus lateralis muscle with dimensions as above. The enlargement  appears to be primarily due to increased volume of fluid within the lesion. An irregular rind of thickened tissue around the periphery of the lesion is again observed and as stated previously, differential of chronic hematoma versus hemorrhagic  solid/cystic tumor remains.  -Orthopedic surgery to evaluate, he will need surgery     Anemia: Hemoglobin 9.8 on admission, down from 13.6 previously.  Iron panel consistent with anemia of chronic disease, followed by iron saturation of 8 mild underlying iron deficiency.  Anemia anemia most likely metabolic due to possible malignancy/tumor.  Hemoglobin slightly lower at 9.3, repeat H&H this afternoon       Spinal stenosis of lumbar region/chronic back pain: Initiated on scheduled Tylenol, as needed oxycodone for moderate pain.  Bowel regimen.        Hyperlipidemia: Resume home statin     Insomnia: Reports multiple and does not help, initiating  on as needed trazodone at night.      Copied text in this note has been reviewed and is accurate as of 09/13/23.         Dictated utilizing Dragon dictation        Candida Bone MD  [facility]  09/13/23  12:20 EDT

## 2023-09-13 NOTE — SIGNIFICANT NOTE
09/13/23 0704   OTHER   Discipline physical therapist   Therapy Assessment/Plan (PT)   Criteria for Skilled Interventions Met (PT) no problems identified which require skilled intervention  (per nsg notes, pt is up sba with his own walker.  Ampac of 24.  No indication for acute PT needs at this time.  Continue mobilizing with nsg supervision.)

## 2023-09-13 NOTE — CASE MANAGEMENT/SOCIAL WORK
Continued Stay Note  [facility]     Patient Name: Enrique Gillette  MRN: [id number]  Today's Date: 9/13/2023    Admit Date: 9/12/2023    Plan: DC home, no needs at this time   Discharge Plan       Row Name 09/13/23 2872       Plan    Plan Comments on [facility] and he has no issues with copays.  DC plan is undetermined.  He hopes to go home to help his wife.  CCP will follow. Thanks, Lyn TEJEDA      Row Name 09/13/23 6265       Plan    Plan DC home, no needs at this time    Plan Comments CCP spoke to pt introduced self, role and reviewed face sheet and dc plans. Pt lives in a ranch home with his wife.  There are no steps to enter the home.  Pt has a cane and 2 walkers that he uses as needed.  He has no other DME.  Pt takes care of his wife who has Alzheimers.  His plan is to dc home but we still do not have a note from ortho at this time.  Pt and his family are aware  that he may need assistance on dc either HH or rehab.  Dc plans are pending at this time.  Pt is independent with his personal care.  He is able to assist wife with meals and takes her for a drive every day.  He just hired someone to assist with his wife for 4 hours on wednesday and 4 hours on Friday.  He has 3 children but they do not live close by.  Pt is on a waiting list for Assisted Living for a first floor apartment for he and his wife at [facility].  His pharmacy is [facility]                   Discharge Codes    No documentation.                 Expected Discharge Date and Time       Expected Discharge Date Expected Discharge Time    Sep 18, 2023               Lyn Rios

## 2023-09-13 NOTE — PROGRESS NOTES
Subsequent to my last note, I was able to speak directly with Dr. Vargas regarding the patient.  He is graciously offered to take over care and evaluate him.  He notes he will see him while in the hospital.  I will sign off at this time.

## 2023-09-14 ENCOUNTER — APPOINTMENT (OUTPATIENT)
Dept: ULTRASOUND IMAGING | Facility: HOSPITAL | Age: 88
DRG: 844 | End: 2023-09-14
Payer: MEDICARE

## 2023-09-14 ENCOUNTER — APPOINTMENT (OUTPATIENT)
Dept: CT IMAGING | Facility: HOSPITAL | Age: 88
DRG: 844 | End: 2023-09-14
Payer: MEDICARE

## 2023-09-14 PROBLEM — J90 PLEURAL EFFUSION, RIGHT: Status: ACTIVE | Noted: 2023-09-14

## 2023-09-14 LAB
ANION GAP SERPL CALCULATED.3IONS-SCNC: 7.9 MMOL/L (ref 5–15)
BUN SERPL-MCNC: 18 MG/DL (ref 8–23)
BUN/CREAT SERPL: 20.5 (ref 7–25)
CALCIUM SPEC-SCNC: 8.6 MG/DL (ref 8.2–9.6)
CHLORIDE SERPL-SCNC: 109 MMOL/L (ref 98–107)
CO2 SERPL-SCNC: 23.1 MMOL/L (ref 22–29)
CREAT SERPL-MCNC: 0.88 MG/DL (ref 0.76–1.27)
DEPRECATED RDW RBC AUTO: 44.2 FL (ref 37–54)
EGFRCR SERPLBLD CKD-EPI 2021: 80.2 ML/MIN/1.73
ERYTHROCYTE [DISTWIDTH] IN BLOOD BY AUTOMATED COUNT: 15 % (ref 12.3–15.4)
GLUCOSE SERPL-MCNC: 91 MG/DL (ref 65–99)
HCT VFR BLD AUTO: 29.1 % (ref 37.5–51)
HGB BLD-MCNC: 9.2 G/DL (ref 13–17.7)
MCH RBC QN AUTO: 25.9 PG (ref 26.6–33)
MCHC RBC AUTO-ENTMCNC: 31.6 G/DL (ref 31.5–35.7)
MCV RBC AUTO: 82 FL (ref 79–97)
PLATELET # BLD AUTO: 297 10*3/MM3 (ref 140–450)
PMV BLD AUTO: 10.5 FL (ref 6–12)
POTASSIUM SERPL-SCNC: 4.1 MMOL/L (ref 3.5–5.2)
RBC # BLD AUTO: 3.55 10*6/MM3 (ref 4.14–5.8)
SODIUM SERPL-SCNC: 140 MMOL/L (ref 136–145)
WBC NRBC COR # BLD: 11.34 10*3/MM3 (ref 3.4–10.8)

## 2023-09-14 PROCEDURE — 25510000001 IOPAMIDOL 61 % SOLUTION: Performed by: STUDENT IN AN ORGANIZED HEALTH CARE EDUCATION/TRAINING PROGRAM

## 2023-09-14 PROCEDURE — 88341 IMHCHEM/IMCYTCHM EA ADD ANTB: CPT | Performed by: ORTHOPAEDIC SURGERY

## 2023-09-14 PROCEDURE — 88112 CYTOPATH CELL ENHANCE TECH: CPT | Performed by: STUDENT IN AN ORGANIZED HEALTH CARE EDUCATION/TRAINING PROGRAM

## 2023-09-14 PROCEDURE — 0 LIDOCAINE 1 % SOLUTION: Performed by: RADIOLOGY

## 2023-09-14 PROCEDURE — 88305 TISSUE EXAM BY PATHOLOGIST: CPT | Performed by: STUDENT IN AN ORGANIZED HEALTH CARE EDUCATION/TRAINING PROGRAM

## 2023-09-14 PROCEDURE — 74177 CT ABD & PELVIS W/CONTRAST: CPT

## 2023-09-14 PROCEDURE — 76942 ECHO GUIDE FOR BIOPSY: CPT

## 2023-09-14 PROCEDURE — 88341 IMHCHEM/IMCYTCHM EA ADD ANTB: CPT

## 2023-09-14 PROCEDURE — 88307 TISSUE EXAM BY PATHOLOGIST: CPT | Performed by: ORTHOPAEDIC SURGERY

## 2023-09-14 PROCEDURE — 88342 IMHCHEM/IMCYTCHM 1ST ANTB: CPT | Performed by: ORTHOPAEDIC SURGERY

## 2023-09-14 PROCEDURE — 80048 BASIC METABOLIC PNL TOTAL CA: CPT | Performed by: STUDENT IN AN ORGANIZED HEALTH CARE EDUCATION/TRAINING PROGRAM

## 2023-09-14 PROCEDURE — 0JBL3ZX EXCISION OF RIGHT UPPER LEG SUBCUTANEOUS TISSUE AND FASCIA, PERCUTANEOUS APPROACH, DIAGNOSTIC: ICD-10-PCS | Performed by: STUDENT IN AN ORGANIZED HEALTH CARE EDUCATION/TRAINING PROGRAM

## 2023-09-14 PROCEDURE — 71260 CT THORAX DX C+: CPT

## 2023-09-14 PROCEDURE — 85027 COMPLETE CBC AUTOMATED: CPT | Performed by: STUDENT IN AN ORGANIZED HEALTH CARE EDUCATION/TRAINING PROGRAM

## 2023-09-14 RX ORDER — PANTOPRAZOLE SODIUM 40 MG/1
40 TABLET, DELAYED RELEASE ORAL
Qty: 30 TABLET | Refills: 0 | Status: SHIPPED | OUTPATIENT
Start: 2023-09-15 | End: 2023-10-15

## 2023-09-14 RX ORDER — LIDOCAINE HYDROCHLORIDE 10 MG/ML
10 INJECTION, SOLUTION INFILTRATION; PERINEURAL ONCE
Status: COMPLETED | OUTPATIENT
Start: 2023-09-14 | End: 2023-09-14

## 2023-09-14 RX ADMIN — Medication 10 ML: at 21:12

## 2023-09-14 RX ADMIN — IOPAMIDOL 85 ML: 612 INJECTION, SOLUTION INTRAVENOUS at 13:18

## 2023-09-14 RX ADMIN — PANTOPRAZOLE SODIUM 40 MG: 40 TABLET, DELAYED RELEASE ORAL at 06:51

## 2023-09-14 RX ADMIN — TAMSULOSIN HYDROCHLORIDE 0.4 MG: 0.4 CAPSULE ORAL at 21:12

## 2023-09-14 RX ADMIN — TRAZODONE HYDROCHLORIDE 25 MG: 50 TABLET ORAL at 21:12

## 2023-09-14 RX ADMIN — Medication 10 ML: at 14:15

## 2023-09-14 RX ADMIN — ROSUVASTATIN CALCIUM 10 MG: 10 TABLET, FILM COATED ORAL at 14:14

## 2023-09-14 RX ADMIN — LIDOCAINE HYDROCHLORIDE 8 ML: 10 INJECTION, SOLUTION INFILTRATION; PERINEURAL at 12:35

## 2023-09-14 NOTE — CONSULTS
Orthopaedic/Musculoskeletal Oncologic  Consult Note      Patient Identification:  Name: Enrique Gillette  Age: 89 y.o.  Sex: male  :  1930  MRN: [id number]    Requesting Provider: Candida Bone MD                      Chief Complaint:  Right thigh mass    History of Present Illness:   Enrique Gillette is a 89 y.o. male who I am seeing in consultation at the request of Heidy Sadler and Smitha regarding the above.  Patient describes a mass in the right anterolateral thigh that has been present since 2023.  It was described as cell phone sized and developed with a traumatic incident.  He was seen by his PCP who ordered an MRI in February.  He was also seen by Dr. David Mai who recommended observation and follow up evaluation if things became worse.      Recently, developed pain over the last month in addition to right lower extremity swelling.  He came to the ED for further workup and evaluation.  He was evaluated by Dr. Sadler who asked that I assume care for further workup and evaluation.    He denies fevers, sweats or chills, no skin changes overlying the mass.  He does described decreased appetite and weight loss.      Past Medical History:  Past Medical History:   Diagnosis Date    Chest pain     Hyperlipidemia     Prostate CA     Prostate cancer     RBBB     Right knee pain     SOB (shortness of breath)        Past Surgical History:  Past Surgical History:   Procedure Laterality Date    CATARACT EXTRACTION Bilateral     COLONOSCOPY         Home Meds:  Medications Prior to Admission   Medication Sig Dispense Refill Last Dose    acetaminophen (TYLENOL) 500 MG tablet Take 1 tablet by mouth Every 6 (Six) Hours As Needed for Mild Pain.   Past Week    NON FORMULARY Omega 3 joint pain   Past Week    rosuvastatin (CRESTOR) 10 MG tablet TAKE 1 TABLET DAILY 90 tablet 3 2023       Current Meds:   Scheduled  Meds:pantoprazole, 40 mg, Oral, Q AM  rosuvastatin, 10 mg, Oral, Daily  senna-docusate sodium, 2 tablet, Oral, BID  sodium chloride, 10 mL, Intravenous, Q12H  tamsulosin, 0.4 mg, Oral, Nightly      Continuous Infusions:   PRN Meds:.  acetaminophen    senna-docusate sodium **AND** polyethylene glycol **AND** bisacodyl **AND** bisacodyl    sodium chloride    sodium chloride    traZODone    Allergies:  No Known Allergies    Social History:   Social History     Occupational History    Not on file   Tobacco Use    Smoking status: Never    Smokeless tobacco: Not on file   Vaping Use    Vaping Use: Never used   Substance and Sexual Activity    Alcohol use: No    Drug use: No    Sexual activity: Never      Social History     Social History Narrative    Not on file       Family History:  Family History   Problem Relation Age of Onset    No Known Problems Mother     No Known Problems Father     No Known Problems Brother     No Known Problems Brother     No Known Problems Brother     No Known Problems Brother         Review of Systems    As per the HPI    Objective:  tMax 24 hrs: Temp (24hrs), Av.9 °F (36.6 °C), Min:97 °F (36.1 °C), Max:100 °F (37.8 °C)    Vitals Ranges:   Temp:  [97 °F (36.1 °C)-100 °F (37.8 °C)] 100 °F (37.8 °C)  Heart Rate:  [82-95] 86  Resp:  [16] 16  BP: (102-121)/(57-73) 112/73  Intake and Output Last 3 Shifts:   I/O last 3 completed shifts:  In: 1520 [P.O.:1520]  Out: -     Physical Exam:  /73 (BP Location: Left arm, Patient Position: Lying)   Pulse 86   Temp 100 °F (37.8 °C) (Oral)   Resp 16   Wt 77.6 kg (171 lb 1.2 oz)   SpO2 92%   BMI 26.38 kg/m²     General Appearance:    Alert, cooperative, no distress, appears stated age   HEENT:    Normocephalic, atraumatic. Sclerae anicteric.  Mucous         membranes moist.   Cardio:   Regular rate and rhythm.   Lungs:     Breathing unlabored on room air       Focused MSK:   Examination of the RLE demonstrates a large mass, subfascial along the  anterolateral thigh.  Mass is slightly mobile in the coronal plane.  He has 4+/5 Quad/Ham, 5/5 DF/PF.  2+ pitting edema at the ankle.  No calf tenderness.  No popliteal or inguinal adenopathy           Data Review:  Admission on 09/12/2023   Component Date Value Ref Range Status    Glucose 09/12/2023 109 (H)  65 - 99 mg/dL Final    BUN 09/12/2023 22  8 - 23 mg/dL Final    Creatinine 09/12/2023 1.06  0.76 - 1.27 mg/dL Final    Sodium 09/12/2023 141  136 - 145 mmol/L Final    Potassium 09/12/2023 4.0  3.5 - 5.2 mmol/L Final    Chloride 09/12/2023 109 (H)  98 - 107 mmol/L Final    CO2 09/12/2023 22.0  22.0 - 29.0 mmol/L Final    Calcium 09/12/2023 8.9  8.2 - 9.6 mg/dL Final    Total Protein 09/12/2023 6.5  6.0 - 8.5 g/dL Final    Albumin 09/12/2023 2.9 (L)  3.5 - 5.2 g/dL Final    ALT (SGPT) 09/12/2023 15  1 - 41 U/L Final    AST (SGOT) 09/12/2023 27  1 - 40 U/L Final    Alkaline Phosphatase 09/12/2023 153 (H)  39 - 117 U/L Final    Total Bilirubin 09/12/2023 0.5  0.0 - 1.2 mg/dL Final    Globulin 09/12/2023 3.6  gm/dL Final    A/G Ratio 09/12/2023 0.8  g/dL Final    BUN/Creatinine Ratio 09/12/2023 20.8  7.0 - 25.0 Final    Anion Gap 09/12/2023 10.0  5.0 - 15.0 mmol/L Final    eGFR 09/12/2023 65.4  >60.0 mL/min/1.73 Final    Protime 09/12/2023 16.7 (H)  11.7 - 14.2 Seconds Final    INR 09/12/2023 1.33 (H)  0.90 - 1.10 Final    PTT 09/12/2023 36.7 (H)  22.7 - 35.4 seconds Final    Right Common Femoral Spont 09/12/2023 Y   Final    Right Common Femoral Competent 09/12/2023 Y   Final    Right Common Femoral Phasic 09/12/2023 Y   Final    Right Common Femoral Compress 09/12/2023 C   Final    Right Common Femoral Augment 09/12/2023 Y   Final    Right Saphenofemoral Junction Comp* 09/12/2023 C   Final    Right Profunda Femoral Compress 09/12/2023 C   Final    Right Proximal Femoral Compress 09/12/2023 C   Final    Right Mid Femoral Spont 09/12/2023 Y   Final    Right Mid Femoral Competent 09/12/2023 Y   Final    Right Mid  Femoral Phasic 09/12/2023 Y   Final    Right Mid Femoral Compress 09/12/2023 C   Final    Right Mid Femoral Augment 09/12/2023 Y   Final    Right Distal Femoral Compress 09/12/2023 C   Final    Right Popliteal Spont 09/12/2023 Y   Final    Right Popliteal Competent 09/12/2023 Y   Final    Right Popliteal Phasic 09/12/2023 Y   Final    Right Popliteal Compress 09/12/2023 C   Final    Right Popliteal Augment 09/12/2023 Y   Final    Right Posterior Tibial Compress 09/12/2023 C   Final    Right Peroneal Compress 09/12/2023 C   Final    Right Gastronemius Compress 09/12/2023 C   Final    Right Greater Saph AK Compress 09/12/2023 C   Final    Right Greater Saph BK Compress 09/12/2023 C   Final    Right Lesser Saph Compress 09/12/2023 C   Final    Left Common Femoral Spont 09/12/2023 Y   Final    Left Common Femoral Competent 09/12/2023 Y   Final    Left Common Femoral Phasic 09/12/2023 Y   Final    Left Common Femoral Compress 09/12/2023 C   Final    Left Common Femoral Augment 09/12/2023 Y   Final    BH CV VAS PRELIMINARY FINDINGS SCR* 09/12/2023 1.0   Final    WBC 09/12/2023 11.23 (H)  3.40 - 10.80 10*3/mm3 Final    RBC 09/12/2023 3.98 (L)  4.14 - 5.80 10*6/mm3 Final    Hemoglobin 09/12/2023 9.8 (L)  13.0 - 17.7 g/dL Final    Hematocrit 09/12/2023 32.1 (L)  37.5 - 51.0 % Final    MCV 09/12/2023 80.7  79.0 - 97.0 fL Final    MCH 09/12/2023 24.6 (L)  26.6 - 33.0 pg Final    MCHC 09/12/2023 30.5 (L)  31.5 - 35.7 g/dL Final    RDW 09/12/2023 14.9  12.3 - 15.4 % Final    RDW-SD 09/12/2023 43.0  37.0 - 54.0 fl Final    MPV 09/12/2023 10.7  6.0 - 12.0 fL Final    Platelets 09/12/2023 302  140 - 450 10*3/mm3 Final    Neutrophil % 09/12/2023 81.7 (H)  42.7 - 76.0 % Final    Lymphocyte % 09/12/2023 6.6 (L)  19.6 - 45.3 % Final    Monocyte % 09/12/2023 8.5  5.0 - 12.0 % Final    Eosinophil % 09/12/2023 2.4  0.3 - 6.2 % Final    Basophil % 09/12/2023 0.4  0.0 - 1.5 % Final    Immature Grans % 09/12/2023 0.4  0.0 - 0.5 % Final     Neutrophils, Absolute 09/12/2023 9.17 (H)  1.70 - 7.00 10*3/mm3 Final    Lymphocytes, Absolute 09/12/2023 0.74  0.70 - 3.10 10*3/mm3 Final    Monocytes, Absolute 09/12/2023 0.96 (H)  0.10 - 0.90 10*3/mm3 Final    Eosinophils, Absolute 09/12/2023 0.27  0.00 - 0.40 10*3/mm3 Final    Basophils, Absolute 09/12/2023 0.04  0.00 - 0.20 10*3/mm3 Final    Immature Grans, Absolute 09/12/2023 0.05  0.00 - 0.05 10*3/mm3 Final    nRBC 09/12/2023 0.0  0.0 - 0.2 /100 WBC Final    ABO Type 09/12/2023 A   Final    RH type 09/12/2023 Positive   Final    Antibody Screen 09/12/2023 Negative   Final    T&S Expiration Date 09/12/2023 9/15/2023 11:59:59 PM   Final    Iron 09/12/2023 22 (L)  59 - 158 mcg/dL Final    Iron Saturation (TSAT) 09/12/2023 8 (L)  20 - 50 % Final    Transferrin 09/12/2023 178 (L)  200 - 360 mg/dL Final    TIBC 09/12/2023 265 (L)  298 - 536 mcg/dL Final    Ferritin 09/12/2023 466.00 (H)  30.00 - 400.00 ng/mL Final    Glucose 09/13/2023 93  65 - 99 mg/dL Final    BUN 09/13/2023 19  8 - 23 mg/dL Final    Creatinine 09/13/2023 1.00  0.76 - 1.27 mg/dL Final    Sodium 09/13/2023 139  136 - 145 mmol/L Final    Potassium 09/13/2023 4.2  3.5 - 5.2 mmol/L Final    Chloride 09/13/2023 110 (H)  98 - 107 mmol/L Final    CO2 09/13/2023 22.1  22.0 - 29.0 mmol/L Final    Calcium 09/13/2023 8.2  8.2 - 9.6 mg/dL Final    BUN/Creatinine Ratio 09/13/2023 19.0  7.0 - 25.0 Final    Anion Gap 09/13/2023 6.9  5.0 - 15.0 mmol/L Final    eGFR 09/13/2023 70.2  >60.0 mL/min/1.73 Final    WBC 09/13/2023 10.66  3.40 - 10.80 10*3/mm3 Final    RBC 09/13/2023 3.74 (L)  4.14 - 5.80 10*6/mm3 Final    Hemoglobin 09/13/2023 9.3 (L)  13.0 - 17.7 g/dL Final    Hematocrit 09/13/2023 31.0 (L)  37.5 - 51.0 % Final    MCV 09/13/2023 82.9  79.0 - 97.0 fL Final    MCH 09/13/2023 24.9 (L)  26.6 - 33.0 pg Final    MCHC 09/13/2023 30.0 (L)  31.5 - 35.7 g/dL Final    RDW 09/13/2023 14.9  12.3 - 15.4 % Final    RDW-SD 09/13/2023 45.0  37.0 - 54.0 fl Final    MPV  09/13/2023 10.7  6.0 - 12.0 fL Final    Platelets 09/13/2023 300  140 - 450 10*3/mm3 Final    Magnesium 09/13/2023 2.0  1.7 - 2.3 mg/dL Final    Phosphorus 09/13/2023 3.1  2.5 - 4.5 mg/dL Final    Hemoglobin 09/13/2023 9.4 (L)  13.0 - 17.7 g/dL Final    Hematocrit 09/13/2023 30.4 (L)  37.5 - 51.0 % Final       Imaging reviewed    Assessment:    Mass of right lower extremity    HLD (hyperlipidemia)    Chronic back pain    Anemia     Enrique Gillette is a 89 y.o. 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      Medical decision-making:  Image guided biopsy order  Complete CT CAP while in house, for purposes of staging.  Discussed the plan with the patient, his son and daughter by phone.  Discussed treatment options with the patient and his family in case the biopsy is consistent with sarcoma.  Discussed radiation therapy, surgical resection and in some cases immunotherapy.  Recommend medical oncology and radiation oncology consultations  Will followup on path results after biopsy and staging studies.  He may be WBAT RLE.  OK for discharge once biopsy and staging studies are complete.    Mark Vargas MD  9/13/2023

## 2023-09-14 NOTE — PLAN OF CARE
Goal Outcome Evaluation:  Plan of Care Reviewed With: patient, family        Progress: no change   Patient is alert and oriented times four. He is very hard of hearing. Biopsy of his right leg scheduled for today. No complaints of pain or discomfort. Patient ambulates to the rest room with assistance of one and a walker.

## 2023-09-14 NOTE — DISCHARGE INSTRUCTIONS
Notify your primary care provider if you experience chest pain, difficulty breathing, fevers/chills, nausea or vomiting, bleeding in stool, excessive diarrhea, numbness or weakness or tingling in any part of your body.  \

## 2023-09-14 NOTE — PROGRESS NOTES
Name: Enrique Gillette ADMIT: 2023   : 1930  PCP: Nazanin Lucio MD    MRN: [id number] LOS: 2 days   AGE/SEX: 89 y.o. male  ROOM: [facility]     Subjective   Subjective   No acute events overnight.  Laying in bed.  Family present at bedside.  Patient denies complaints this morning, denies leg pain, fevers or chills.  Plan for biopsy today.  States slept better with trazodone.  Reports improvement in right lower remedy swelling  Review of Systems     Objective   Objective   Vital Signs  Temp:  [98.3 °F (36.8 °C)-100 °F (37.8 °C)] 98.3 °F (36.8 °C)  Heart Rate:  [84-88] 84  Resp:  [16] 16  BP: (100-112)/(56-73) 100/60  SpO2:  [92 %-96 %] 92 %  on   ;   Device (Oxygen Therapy): room air  Body mass index is 24.45 kg/m².  Physical Exam    General: Alert, laying in bed, not in distress, hard of hearing   HEENT: Normocephalic, atraumatic  CV: Regular rate and rhythm, no murmurs rubs or gallops  Lungs: Clear to auscultation bilaterally, no crackles or wheezes  Abdomen: Soft, nontender, nondistended  Extremities: Large right thigh swelling/mass, right lower extremity edema      Results Review     I reviewed the patient's new clinical results.  Results from last 7 days   Lab Units 23  0723  1021   WBC 10*3/mm3 11.34*  --  10.66 11.23*   HEMOGLOBIN g/dL 9.2* 9.4* 9.3* 9.8*   PLATELETS 10*3/mm3 297  --  300 302       Results from last 7 days   Lab Units 23  0723  0723  1021   SODIUM mmol/L 140 139 141   POTASSIUM mmol/L 4.1 4.2 4.0   CHLORIDE mmol/L 109* 110* 109*   CO2 mmol/L 23.1 22.1 22.0   BUN mg/dL 18 19 22   CREATININE mg/dL 0.88 1.00 1.06   GLUCOSE mg/dL 91 93 109*     Estimated Creatinine Clearance: 53.3 mL/min (by C-G formula based on SCr of 0.88 mg/dL).  Results from last 7 days   Lab Units 23  1021   ALBUMIN g/dL 2.9*   BILIRUBIN mg/dL 0.5   ALK PHOS U/L 153*   AST (SGOT) U/L 27   ALT (SGPT) U/L 15       Results from last 7  days   Lab Units 09/14/23  0700 09/13/23  0714 09/12/23  1021   CALCIUM mg/dL 8.6 8.2 8.9   ALBUMIN g/dL  --   --  2.9*   MAGNESIUM mg/dL  --  2.0  --    PHOSPHORUS mg/dL  --  3.1  --        No results found for: COVID19  No results found for: HGBA1C, POCGLU        CT Lower Extremity Right With Contrast  Narrative: RIGHT THIGH CT WITH CONTRAST     HISTORY: Follow-up enlarging soft tissue mass in the thigh. MRI in March 2023 demonstrated an 8 x 4 x 10 cm long intramuscular right thigh lesion  with hemorrhagic components. On the MRI, differential considerations of  hematoma with adjacent inflammatory response versus solid mass lesion  with hemorrhage were provided.     TECHNIQUE: Right thigh CT with 85 mL of Isovue-300 IV contrast is  provided and correlated with the MRI from 03/21/2023. Radiation dose  reduction techniques were utilized, including automated exposure control  and exposure modulation based on body size.        FINDINGS: The fluid component of the right thigh lesion has  substantially increased in size in the interval since the prior exam.  The lesion measures 19 cm long and 8 x 11 cm axial and is positioned  within the vastus lateralis muscle. Most of the volume of the lesion  comprises fluid signal. There is a rind of intermediate density,  possibly enhancing material around the periphery of the lesion measuring  up to about 12 mm thick. This is most pronounced along the proximal and  anterior portions of the lesion.     No lymphadenopathy. Anterior thigh musculature is otherwise preserved.  There is fatty atrophic change of the semimembranosus muscle and  partially along the biceps femoris. No joint effusion at the knee or the  hip. No bone lesion. Arthritis at the hip.     Impression: Compared with MRI 03/21/2023, there has been significant  enlargement in the intramuscular anterolateral right thigh lesion in the  vastus lateralis muscle with dimensions as above. The enlargement  appears to be  primarily due to increased volume of fluid within the  lesion. An irregular rind of thickened tissue around the periphery of  the lesion is again observed and as stated previously, differential of  chronic hematoma versus hemorrhagic solid/cystic tumor remains.     This report was finalized on 9/12/2023 6:52 PM by Dr. Victor Manuel Son M.D.     Duplex Venous Lower Extremity - Right CAR    Normal right lower extremity venous duplex scan.    Heterogeneous, predominantly hypoechoic collection anterior right thigh   measuring 17 x 9 cm, possibly consistent with hematoma.    Scheduled Medications  pantoprazole, 40 mg, Oral, Q AM  rosuvastatin, 10 mg, Oral, Daily  senna-docusate sodium, 2 tablet, Oral, BID  sodium chloride, 10 mL, Intravenous, Q12H  tamsulosin, 0.4 mg, Oral, Nightly    Infusions   Diet  Diet: Regular/House Diet; Texture: Regular Texture (IDDSI 7); Fluid Consistency: Thin (IDDSI 0)    I have personally reviewed       x Laboratory     Microbiology     Radiology     EKG/Telemetry    Cardiology/Vascular     Pathology      Records       Assessment/Plan     Active Hospital Problems    Diagnosis  POA    **Mass of right lower extremity [R22.41]  Yes    HLD (hyperlipidemia) [E78.5]  Unknown    Chronic back pain [M54.9, G89.29]  Unknown    Anemia [D64.9]  Unknown      Resolved Hospital Problems   No resolved problems to display.           Patient is a 89-year-old male with a history of including but not limited to chronic back pain, hyperlipidemia skin cancer, presented to the ED complaining of worsening right leg pain and swelling.       Right  thigh mass: Patient with significant mass/swelling on exam.  Tender to palpation.  No signs of infection.  Duplex ultrasound negative for DVT.  - CT scan of the right lower extremity significant enlargement in the intramuscular anterolateral right thigh lesion in the vastus lateralis muscle with dimensions as above. The enlargement  appears to be primarily due to increased  volume of fluid within the lesion. An irregular rind of thickened tissue around the periphery of the lesion is again observed and as stated previously, differential of chronic hematoma versus hemorrhagic solid/cystic tumor remains.  -Orthopedic surgery  following, plan for biopsy today.      Pleural effusion: CT abdomen pelvis on 9/14/2023 obtained for evaluation of metastatic disease showed moderate size right pleural effusion and small left pleural effusion.  Patient denies shortness of breath.  On room air.  Ordered ultrasound-guided thoracentesis, plans to for cytology      Anemia:   Hemoglobin 9.8 on admission, down from 13.6 previously.    Iron panel consistent with anemia of chronic disease, followed by iron saturation of 8 mild underlying iron deficiency.  anemia most likely  due to possible malignancy/tumor and less likely possible hematoma  Hemoglobin slightly lower at 9.3, repeat H&H this afternoon  Hemoglobin stable today.  Monitor.     Spinal stenosis of lumbar region/chronic back pain: Continue cheduled Tylenol, as needed oxycodone for moderate pain.  Bowel regimen.        Hyperlipidemia: Resume home statin     Insomnia: Continue trazodone, reports sleep better with trazodone.      CODE STATUS: Wants chest compressions, no intubation.  Disposition: Home, timing to be determined pending biopsy and orthopedic surgery recommendation      Copied text in this note has been reviewed and is accurate as of 09/14/23.         Dictated utilizing Dragon dictation        Candida Bone MD  [facility]  09/14/23  10:44 EDT

## 2023-09-15 ENCOUNTER — APPOINTMENT (OUTPATIENT)
Dept: ULTRASOUND IMAGING | Facility: HOSPITAL | Age: 88
DRG: 844 | End: 2023-09-15
Payer: MEDICARE

## 2023-09-15 LAB
ANION GAP SERPL CALCULATED.3IONS-SCNC: 9.1 MMOL/L (ref 5–15)
APPEARANCE FLD: CLEAR
BASOPHILS # BLD AUTO: 0.05 10*3/MM3 (ref 0–0.2)
BASOPHILS NFR BLD AUTO: 0.3 % (ref 0–1.5)
BUN SERPL-MCNC: 17 MG/DL (ref 8–23)
BUN/CREAT SERPL: 16.3 (ref 7–25)
CALCIUM SPEC-SCNC: 8.3 MG/DL (ref 8.2–9.6)
CHLORIDE SERPL-SCNC: 106 MMOL/L (ref 98–107)
CO2 SERPL-SCNC: 22.9 MMOL/L (ref 22–29)
COLOR FLD: YELLOW
CREAT SERPL-MCNC: 1.04 MG/DL (ref 0.76–1.27)
CRP SERPL-MCNC: 7.52 MG/DL (ref 0–0.5)
DEPRECATED RDW RBC AUTO: 45.8 FL (ref 37–54)
EGFRCR SERPLBLD CKD-EPI 2021: 66.9 ML/MIN/1.73
EOSINOPHIL # BLD AUTO: 0.35 10*3/MM3 (ref 0–0.4)
EOSINOPHIL NFR BLD AUTO: 2.3 % (ref 0.3–6.2)
ERYTHROCYTE [DISTWIDTH] IN BLOOD BY AUTOMATED COUNT: 15.2 % (ref 12.3–15.4)
ERYTHROCYTE [SEDIMENTATION RATE] IN BLOOD: 87 MM/HR (ref 0–20)
FERRITIN SERPL-MCNC: 455 NG/ML (ref 30–400)
FOLATE SERPL-MCNC: 12.9 NG/ML (ref 4.78–24.2)
GLUCOSE FLD-MCNC: 105 MG/DL
GLUCOSE SERPL-MCNC: 100 MG/DL (ref 65–99)
HCT VFR BLD AUTO: 32.1 % (ref 37.5–51)
HGB BLD-MCNC: 9.9 G/DL (ref 13–17.7)
HGB RETIC QN AUTO: 25.3 PG (ref 29.8–36.1)
IMM GRANULOCYTES # BLD AUTO: 0.06 10*3/MM3 (ref 0–0.05)
IMM GRANULOCYTES NFR BLD AUTO: 0.4 % (ref 0–0.5)
IMM RETICS NFR: 35.9 % (ref 3–15.8)
LDH FLD-CCNC: 120 U/L
LDH FLD-CCNC: 132 U/L
LDH SERPL-CCNC: 271 U/L (ref 135–225)
LYMPHOCYTES # BLD AUTO: 1.4 10*3/MM3 (ref 0.7–3.1)
LYMPHOCYTES NFR BLD AUTO: 9.4 % (ref 19.6–45.3)
LYMPHOCYTES NFR FLD MANUAL: 83 %
MCH RBC QN AUTO: 25.3 PG (ref 26.6–33)
MCHC RBC AUTO-ENTMCNC: 30.8 G/DL (ref 31.5–35.7)
MCV RBC AUTO: 82.1 FL (ref 79–97)
METHOD: NORMAL
MONOCYTES # BLD AUTO: 1.29 10*3/MM3 (ref 0.1–0.9)
MONOCYTES NFR BLD AUTO: 8.6 % (ref 5–12)
MONOCYTES NFR FLD: 4 %
MONOS+MACROS NFR FLD: 5 %
NEUTROPHILS NFR BLD AUTO: 11.81 10*3/MM3 (ref 1.7–7)
NEUTROPHILS NFR BLD AUTO: 79 % (ref 42.7–76)
NEUTROPHILS NFR FLD MANUAL: 8 %
NRBC BLD AUTO-RTO: 0 /100 WBC (ref 0–0.2)
NUC CELL # FLD: 760 /MM3
PH FLD: 7.61 [PH]
PLATELET # BLD AUTO: 301 10*3/MM3 (ref 140–450)
PMV BLD AUTO: 11.2 FL (ref 6–12)
POTASSIUM SERPL-SCNC: 4.4 MMOL/L (ref 3.5–5.2)
PROT FLD-MCNC: 1.6 G/DL
RBC # BLD AUTO: 3.91 10*6/MM3 (ref 4.14–5.8)
RBC # FLD AUTO: 187 /MM3
RETICS # AUTO: 0.08 10*6/MM3 (ref 0.02–0.13)
RETICS # AUTO: 0.08 10*6/MM3 (ref 0.02–0.13)
RETICS/RBC NFR AUTO: 2.06 % (ref 0.7–1.9)
RETICS/RBC NFR AUTO: 2.06 % (ref 0.7–1.9)
SODIUM SERPL-SCNC: 138 MMOL/L (ref 136–145)
WBC NRBC COR # BLD: 14.96 10*3/MM3 (ref 3.4–10.8)

## 2023-09-15 PROCEDURE — 86334 IMMUNOFIX E-PHORESIS SERUM: CPT | Performed by: INTERNAL MEDICINE

## 2023-09-15 PROCEDURE — 0 LIDOCAINE 1 % SOLUTION: Performed by: RADIOLOGY

## 2023-09-15 PROCEDURE — 88300 SURGICAL PATH GROSS: CPT | Performed by: STUDENT IN AN ORGANIZED HEALTH CARE EDUCATION/TRAINING PROGRAM

## 2023-09-15 PROCEDURE — 88184 FLOWCYTOMETRY/ TC 1 MARKER: CPT

## 2023-09-15 PROCEDURE — 82746 ASSAY OF FOLIC ACID SERUM: CPT | Performed by: INTERNAL MEDICINE

## 2023-09-15 PROCEDURE — 80048 BASIC METABOLIC PNL TOTAL CA: CPT | Performed by: STUDENT IN AN ORGANIZED HEALTH CARE EDUCATION/TRAINING PROGRAM

## 2023-09-15 PROCEDURE — 0W993ZZ DRAINAGE OF RIGHT PLEURAL CAVITY, PERCUTANEOUS APPROACH: ICD-10-PCS | Performed by: STUDENT IN AN ORGANIZED HEALTH CARE EDUCATION/TRAINING PROGRAM

## 2023-09-15 PROCEDURE — 85025 COMPLETE CBC W/AUTO DIFF WBC: CPT | Performed by: STUDENT IN AN ORGANIZED HEALTH CARE EDUCATION/TRAINING PROGRAM

## 2023-09-15 PROCEDURE — 85046 RETICYTE/HGB CONCENTRATE: CPT | Performed by: INTERNAL MEDICINE

## 2023-09-15 PROCEDURE — 83615 LACTATE (LD) (LDH) ENZYME: CPT | Performed by: STUDENT IN AN ORGANIZED HEALTH CARE EDUCATION/TRAINING PROGRAM

## 2023-09-15 PROCEDURE — 88185 FLOWCYTOMETRY/TC ADD-ON: CPT

## 2023-09-15 PROCEDURE — 88112 CYTOPATH CELL ENHANCE TECH: CPT | Performed by: STUDENT IN AN ORGANIZED HEALTH CARE EDUCATION/TRAINING PROGRAM

## 2023-09-15 PROCEDURE — 87205 SMEAR GRAM STAIN: CPT | Performed by: STUDENT IN AN ORGANIZED HEALTH CARE EDUCATION/TRAINING PROGRAM

## 2023-09-15 PROCEDURE — 99223 1ST HOSP IP/OBS HIGH 75: CPT | Performed by: INTERNAL MEDICINE

## 2023-09-15 PROCEDURE — 85045 AUTOMATED RETICULOCYTE COUNT: CPT | Performed by: INTERNAL MEDICINE

## 2023-09-15 PROCEDURE — 88305 TISSUE EXAM BY PATHOLOGIST: CPT | Performed by: STUDENT IN AN ORGANIZED HEALTH CARE EDUCATION/TRAINING PROGRAM

## 2023-09-15 PROCEDURE — 84157 ASSAY OF PROTEIN OTHER: CPT | Performed by: STUDENT IN AN ORGANIZED HEALTH CARE EDUCATION/TRAINING PROGRAM

## 2023-09-15 PROCEDURE — 83986 ASSAY PH BODY FLUID NOS: CPT | Performed by: STUDENT IN AN ORGANIZED HEALTH CARE EDUCATION/TRAINING PROGRAM

## 2023-09-15 PROCEDURE — 87070 CULTURE OTHR SPECIMN AEROBIC: CPT | Performed by: STUDENT IN AN ORGANIZED HEALTH CARE EDUCATION/TRAINING PROGRAM

## 2023-09-15 PROCEDURE — 83615 LACTATE (LD) (LDH) ENZYME: CPT | Performed by: INTERNAL MEDICINE

## 2023-09-15 PROCEDURE — 89051 BODY FLUID CELL COUNT: CPT | Performed by: STUDENT IN AN ORGANIZED HEALTH CARE EDUCATION/TRAINING PROGRAM

## 2023-09-15 PROCEDURE — 85652 RBC SED RATE AUTOMATED: CPT | Performed by: INTERNAL MEDICINE

## 2023-09-15 PROCEDURE — 82945 GLUCOSE OTHER FLUID: CPT | Performed by: STUDENT IN AN ORGANIZED HEALTH CARE EDUCATION/TRAINING PROGRAM

## 2023-09-15 PROCEDURE — 82784 ASSAY IGA/IGD/IGG/IGM EACH: CPT | Performed by: INTERNAL MEDICINE

## 2023-09-15 PROCEDURE — 76942 ECHO GUIDE FOR BIOPSY: CPT

## 2023-09-15 PROCEDURE — 87075 CULTR BACTERIA EXCEPT BLOOD: CPT | Performed by: STUDENT IN AN ORGANIZED HEALTH CARE EDUCATION/TRAINING PROGRAM

## 2023-09-15 PROCEDURE — 82728 ASSAY OF FERRITIN: CPT | Performed by: INTERNAL MEDICINE

## 2023-09-15 PROCEDURE — 86140 C-REACTIVE PROTEIN: CPT | Performed by: INTERNAL MEDICINE

## 2023-09-15 PROCEDURE — 87015 SPECIMEN INFECT AGNT CONCNTJ: CPT | Performed by: STUDENT IN AN ORGANIZED HEALTH CARE EDUCATION/TRAINING PROGRAM

## 2023-09-15 RX ORDER — LIDOCAINE HYDROCHLORIDE 10 MG/ML
10 INJECTION, SOLUTION INFILTRATION; PERINEURAL ONCE
Status: COMPLETED | OUTPATIENT
Start: 2023-09-15 | End: 2023-09-15

## 2023-09-15 RX ADMIN — LIDOCAINE HYDROCHLORIDE 7 ML: 10 INJECTION, SOLUTION INFILTRATION; PERINEURAL at 09:29

## 2023-09-15 RX ADMIN — PANTOPRAZOLE SODIUM 40 MG: 40 TABLET, DELAYED RELEASE ORAL at 06:13

## 2023-09-15 RX ADMIN — Medication 10 ML: at 21:46

## 2023-09-15 RX ADMIN — ACETAMINOPHEN 1000 MG: 500 TABLET, FILM COATED ORAL at 18:30

## 2023-09-15 RX ADMIN — Medication 10 ML: at 08:00

## 2023-09-15 RX ADMIN — TAMSULOSIN HYDROCHLORIDE 0.4 MG: 0.4 CAPSULE ORAL at 21:45

## 2023-09-15 RX ADMIN — TRAZODONE HYDROCHLORIDE 25 MG: 50 TABLET ORAL at 21:45

## 2023-09-15 NOTE — CONSULTS
[facility] Radiation Oncology   Inpatient Consult    Chief Complaint  Right thigh mass      Diagnosis:     Mass of right lower extremity  Create Overview []  Unprioritized   -3 days    Freeman Juarez MD        Details  Code: R22.41Noted: 9/12/2023          Overall Stage   Staging pending diagnosis confirmation.      Pacemaker: no  Prior History of Radiation: yes, received prior radiation to the prostate  Contraindications to Radiation: no  Patient Requires Pregnancy Test: No, patient is male    HPI:    Enrique Gillette is a 89 y.o. male with a right thigh mass that has been growing and is concerning for underlying malignancy.     1/30/2023 an x-ray of the femur was performed at [facility] which showed a focal soft tissue prominence on the anterolateral right thigh.  MRI was recommended along with follow-up with his PCP.    1/31/2023 the patient presented to his PCP with complaints of a swollen right upper leg.  A palpable nonmovable mass to the right anterior thigh was noted.  MRI femur was recommended.    3/21/2023 MRI of the right femur showed a lesion within the substance of the vastus lateralis muscle centered at the level of the proximal femoral shaft measuring 7.8 x 3.6 x 10.2 cm.    5/11/2023 The patient returned to his PCP to review his recent MRI imaging.  He was reviewed to an orthopedic surgeon for further evaluation.    5/31/2023 The patient was seen by Dr. Mai and was offered close observation with repeat MRI in 3 months unless the mass got bigger or more painful. The other option was a referral to an orthopedic oncologist. The patient preferred for close observation.     9/12/2023 the patient presented to the emergency department at [facility].  The patient's daughter had come to town and noticed increased swelling in his right thigh compared to the previous time she saw him.  Due to this change in size she brought him to the emergency department. In the ED it was noted he had no new motor  or sensory changes.     9/13/2023 The patient was seen by Dr. Vargas of [facility]. He recommended CT of the chest abdomen and pelvis for staging purposes and biopsy to provide a diagnosis.  Given the concern for underlying malignancy radiation oncology was consulted.    CT of the chest abdomen and pelvis incompletely evaluated the right thigh mass but showed no evidence of convincing metastatic disease.    9/14/2023 biopsy was performed with pathology still pending at the time of my visit.  The preliminary diagnosis is now available and reads malignant spindle cell neoplasm, favor high-grade sarcoma, pending outside consult at the [facility].    Discussion from earlier today with Dr. 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  Definitive treatment options were discussed.  Palliative options including combinations of radiation alone or palliative surgery followed by palliative radiation were discussed.    I visited the patient today and reiterated the same treatment options as offered by Dr. Vargas.  The patient reports he is not currently in pain in his thigh but does have right-sided weakness.  It is unclear if this weakness is related to his tumor versus spurs near his spine that been present for some time.  The patient cares for his wife who has Alzheimer's disease.  The patient does have support from his children however many of them live out of state.  Patient is ready to learn more about radiotherapy options.    Imaging:      CT Chest With Contrast Diagnostic    Result Date: 9/14/2023  Examination: CT of the chest, abdomen, and pelvis with contrast  TECHNIQUE: CT of the chest, abdomen, and pelvis after the uneventful administration of nonionic intravenous contrast per protocol. Radiation dose reduction techniques were utilized, including automated exposure control and exposure modulation based on body size.  HISTORY: Metastatic disease evaluation. Musculoskeletal neoplasm  in the right thigh  COMPARISON:None available  FINDINGS: Chest:  There are moderate sized right and small left pleural effusions. Dependent atelectasis is seen, without consolidation or pneumothorax. There is old granulomatous disease. The central airways are patent.  No enlarged supraclavicular, axillary, mediastinal, or hilar lymph nodes are seen. The mediastinal vasculature is normal in caliber. There are coronary calcifications. The heart is mildly enlarged, without pericardial effusion.  Abdomen and pelvis:  Old granulomatous disease is seen in the liver and spleen. Low-attenuation right renal lesions are technically indeterminate, likely cysts. The left kidney is absent. There is a left-sided urinary bladder diverticulum. A pancreatic calcification is compatible with chronic pancreatitis.  The liver, gallbladder, spleen, adrenal glands,, stomach, small bowel, appendix, large bowel, and abdominal vasculature are normal. No intraperitoneal fluid collection or free gas are seen. No enlarged lymph nodes are demonstrated.  Bone windows demonstrate degenerative changes, without suspicious osseous lesion seen.  There is an incompletely evaluated right thigh mass, measuring 8 x 5.3 cm on series 2, image 105.      1. Incompletely evaluated right thigh mass  2. Moderate size right and small left pleural effusions  3. Incidental findings as above, without convincing evidence of metastatic disease in the chest, abdomen, or pelvis.   This report was finalized on 9/14/2023 2:50 PM by Dr. Tony Foley M.D.      CT Abdomen Pelvis With Contrast    Result Date: 9/14/2023  Examination: CT of the chest, abdomen, and pelvis with contrast  TECHNIQUE: CT of the chest, abdomen, and pelvis after the uneventful administration of nonionic intravenous contrast per protocol. Radiation dose reduction techniques were utilized, including automated exposure control and exposure modulation based on body size.  HISTORY: Metastatic disease  evaluation. Musculoskeletal neoplasm in the right thigh  COMPARISON:None available  FINDINGS: Chest:  There are moderate sized right and small left pleural effusions. Dependent atelectasis is seen, without consolidation or pneumothorax. There is old granulomatous disease. The central airways are patent.  No enlarged supraclavicular, axillary, mediastinal, or hilar lymph nodes are seen. The mediastinal vasculature is normal in caliber. There are coronary calcifications. The heart is mildly enlarged, without pericardial effusion.  Abdomen and pelvis:  Old granulomatous disease is seen in the liver and spleen. Low-attenuation right renal lesions are technically indeterminate, likely cysts. The left kidney is absent. There is a left-sided urinary bladder diverticulum. A pancreatic calcification is compatible with chronic pancreatitis.  The liver, gallbladder, spleen, adrenal glands,, stomach, small bowel, appendix, large bowel, and abdominal vasculature are normal. No intraperitoneal fluid collection or free gas are seen. No enlarged lymph nodes are demonstrated.  Bone windows demonstrate degenerative changes, without suspicious osseous lesion seen.  There is an incompletely evaluated right thigh mass, measuring 8 x 5.3 cm on series 2, image 105.      1. Incompletely evaluated right thigh mass  2. Moderate size right and small left pleural effusions  3. Incidental findings as above, without convincing evidence of metastatic disease in the chest, abdomen, or pelvis.   This report was finalized on 9/14/2023 2:50 PM by Dr. Tony Foley M.D.      US Guided Tissue Biopsy    Result Date: 9/14/2023  US GUIDED TISSUE ASPIRATION AND BIOPSY OF RIGHT ANTERIOR THIGH MASS 09/14/2023  HISTORY: Right anterior thigh mass.  After signed informed consent was obtained the anterior right thigh was prepped and draped in the usual sterile fashion. Lidocaine was used for local anesthesia.  18-gauge needle was introduced into the hypoechoic  collection of the anterior right thigh. Approximately 110 mL of nonpurulent serosanguineous fluid was aspirated. This was followed by core biopsies of a more solid-appearing component of the mass using an 18-gauge biopsy device.  Confirmatory images were obtained.  Patient tolerated the procedure well with no complications. Pathology report is pending.  This report was finalized on 9/14/2023 2:57 PM by Dr. Deven Zhang M.D.      Duplex Venous Lower Extremity - Right CAR    Result Date: 9/12/2023    Normal right lower extremity venous duplex scan.   Heterogeneous, predominantly hypoechoic collection anterior right thigh measuring 17 x 9 cm, possibly consistent with hematoma.     US Thoracentesis    Result Date: 9/15/2023  ULTRASOUND-GUIDED RIGHT THORACENTESIS. 9/15/2023  HISTORY: Pleural effusion.  After signed informed consent was obtained the patient was prepped and draped in the upright sitting position. Lidocaine was used for local anesthesia.  Ultrasound guidance was used to place the thoracentesis catheter into the right pleural fluid collection. 600 cc was removed.  Confirmatory images were obtained.  Patient tolerated the procedure well with no complications.      Ultrasound-guided right thoracentesis as described.    This report was finalized on 9/15/2023 12:01 PM by Dr. Deven Zhang M.D.      CT Lower Extremity Right With Contrast    Result Date: 9/12/2023  RIGHT THIGH CT WITH CONTRAST  HISTORY: Follow-up enlarging soft tissue mass in the thigh. MRI in March 2023 demonstrated an 8 x 4 x 10 cm long intramuscular right thigh lesion with hemorrhagic components. On the MRI, differential considerations of hematoma with adjacent inflammatory response versus solid mass lesion with hemorrhage were provided.  TECHNIQUE: Right thigh CT with 85 mL of Isovue-300 IV contrast is provided and correlated with the MRI from 03/21/2023. Radiation dose reduction techniques were utilized, including automated exposure  control and exposure modulation based on body size.   FINDINGS: The fluid component of the right thigh lesion has substantially increased in size in the interval since the prior exam. The lesion measures 19 cm long and 8 x 11 cm axial and is positioned within the vastus lateralis muscle. Most of the volume of the lesion comprises fluid signal. There is a rind of intermediate density, possibly enhancing material around the periphery of the lesion measuring up to about 12 mm thick. This is most pronounced along the proximal and anterior portions of the lesion.  No lymphadenopathy. Anterior thigh musculature is otherwise preserved. There is fatty atrophic change of the semimembranosus muscle and partially along the biceps femoris. No joint effusion at the knee or the hip. No bone lesion. Arthritis at the hip.      Compared with MRI 03/21/2023, there has been significant enlargement in the intramuscular anterolateral right thigh lesion in the vastus lateralis muscle with dimensions as above. The enlargement appears to be primarily due to increased volume of fluid within the lesion. An irregular rind of thickened tissue around the periphery of the lesion is again observed and as stated previously, differential of chronic hematoma versus hemorrhagic solid/cystic tumor remains.  This report was finalized on 9/12/2023 6:52 PM by Dr. Victor Manuel Son M.D.         Pathology:    9/14/2023  Preliminary Diagnosis   1. Soft Tissue, Right, Thigh, Biopsy:  Malignant spindle cell neoplasm, favor high grade sarcoma, pending outside consult at the [facility].       Labs:    Lab Results   Component Value Date    CREATININE 1.04 09/15/2023                 Problem List:    Mass of right lower extremity    HLD (hyperlipidemia)    Chronic back pain    Anemia    Pleural effusion, right         Medications:  No current facility-administered medications on file prior to encounter.     Current Outpatient Medications on File Prior to  "Encounter   Medication Sig Dispense Refill    acetaminophen (TYLENOL) 500 MG tablet Take 1 tablet by mouth Every 6 (Six) Hours As Needed for Mild Pain.      NON FORMULARY Omega 3 joint pain      rosuvastatin (CRESTOR) 10 MG tablet TAKE 1 TABLET DAILY 90 tablet 3          Allergies:  No Known Allergies      Family History:  Family History   Problem Relation Age of Onset    No Known Problems Mother     No Known Problems Father     No Known Problems Brother     No Known Problems Brother     No Known Problems Brother     No Known Problems Brother            Social History:  Social History     Socioeconomic History    Marital status:    Tobacco Use    Smoking status: Never   Vaping Use    Vaping Use: Never used   Substance and Sexual Activity    Alcohol use: No    Drug use: No    Sexual activity: Never         Distance From Clinic: <30 minutes    Patient has someone who can assist with transportation: yes      Review of Systems:    Review of Systems      Vital Signs:  /75 (BP Location: Left arm, Patient Position: Lying)   Pulse 94   Temp 97.9 °F (36.6 °C) (Oral)   Resp 18   Wt 74.3 kg (163 lb 12.8 oz)   SpO2 94%   BMI 25.26 kg/m²   Estimated body mass index is 25.26 kg/m² as calculated from the following:    Height as of 5/11/23: 171.5 cm (67.52\").    Weight as of this encounter: 74.3 kg (163 lb 12.8 oz).  There were no vitals filed for this visit.      ECOG: Ambulatory and capable of all selfcare but unable to carry out any work activities; up and about more than 50% of waking hours = 2    Physical Exam  Constitutional:       General: He is not in acute distress.  HENT:      Head: Normocephalic and atraumatic.   Pulmonary:      Effort: Pulmonary effort is normal. No respiratory distress.   Musculoskeletal:      Comments: Large and firm right upper thigh mass    Neurological:      Mental Status: He is alert.          Result Review :           Diagnoses and all orders for this visit:    1. Mass of right " lower extremity (Primary)    2. Acute anemia    Other orders  -     Comprehensive Metabolic Panel; Standing  -     Protime-INR; Standing  -     aPTT; Standing  -     CBC & Differential; Standing  -     Duplex Venous Lower Extremity - Right CAR; Standing  -     Comprehensive Metabolic Panel  -     Protime-INR  -     aPTT  -     CBC & Differential  -     Duplex Venous Lower Extremity - Right CAR  -     Type & Screen; Standing  -     Type & Screen  -     CT Lower Extremity Right With Contrast; Standing  -     CT Lower Extremity Right With Contrast  -     LHA (on-call MD unless specified) Details; Standing  -     LHA (on-call MD unless specified) Details  -     Iron Profile; Standing  -     Iron Profile  -     Ferritin; Standing  -     Ferritin  -     Inpatient Admission; Standing  -     Inpatient Admission  -     rosuvastatin (CRESTOR) tablet 10 mg  -     acetaminophen (TYLENOL) tablet 1,000 mg  -     tamsulosin (FLOMAX) 24 hr capsule 0.4 mg  -     Inpatient Orthopedic Surgery Consult; Standing  -     Inpatient Orthopedic Surgery Consult  -     Vital Signs; Standing  -     Notify Physician (With Default Parameters); Standing  -     Up with assistance; Standing  -     Intake & Output; Standing  -     Weigh patient; Standing  -     Daily Weights; Standing  -     Oral Care; Standing  -     Basic Metabolic Panel; Standing  -     CBC (No Diff); Standing  -     Magnesium; Standing  -     Phosphorus; Standing  -     Insert Peripheral IV; Standing  -     Saline Lock & Maintain IV Access; Standing  -     sodium chloride 0.9 % flush 10 mL  -     sodium chloride 0.9 % flush 10 mL  -     sodium chloride 0.9 % infusion 40 mL  -     sennosides-docusate (PERICOLACE) 8.6-50 MG per tablet 2 tablet  -     polyethylene glycol (MIRALAX) packet 17 g  -     bisacodyl (DULCOLAX) EC tablet 5 mg  -     bisacodyl (DULCOLAX) suppository 10 mg  -     Place Sequential Compression Device; Standing  -     Maintain Sequential Compression Device;  Standing  -     Code Status and Medical Interventions:; Standing  -     Cancel: Diet: Regular/House Diet; Texture: Regular Texture (IDDSI 7); Fluid Consistency: Thin (IDDSI 0); Standing  -     Vital Signs  -     Vital Signs  -     Vital Signs  -     Notify Physician (With Default Parameters)  -     Intake & Output  -     Weigh patient  -     Daily Weights  -     Oral Care  -     Insert Peripheral IV  -     Saline Lock & Maintain IV Access  -     Place Sequential Compression Device  -     Maintain Sequential Compression Device  -     Code Status and Medical Interventions:  -     Cancel: Diet: Regular/House Diet; Texture: Regular Texture (IDDSI 7); Fluid Consistency: Thin (IDDSI 0)  -     pantoprazole (PROTONIX) EC tablet 40 mg  -     iopamidol (ISOVUE-300) 61 % injection 100 mL  -     Cancel: Hemoglobin & Hematocrit, Blood; Standing  -     Cancel: Hemoglobin & Hematocrit, Blood  -     PT Consult: Eval & Treat Functional Mobility Below Baseline; Standing  -     PT Consult: Eval & Treat Functional Mobility Below Baseline  -     Basic Metabolic Panel  -     CBC (No Diff)  -     Magnesium  -     Phosphorus  -     Vital Signs  -     Vital Signs  -     Vital Signs  -     Vital Signs  -     Vital Signs  -     Vital Signs  -     Intake & Output  -     Intake & Output  -     Daily Weights  -     Oral Care  -     Oral Care  -     traZODone (DESYREL) tablet 25 mg  -     Hemoglobin & Hematocrit, Blood; Standing  -     Hemoglobin & Hematocrit, Blood  -     Basic Metabolic Panel; Standing  -     CBC (No Diff); Standing  -     Obtain Informed Consent; Standing  -     Cancel: CT Needle Biopsy Muscle; Standing  -     Tissue Pathology Exam; Standing  -     Cancel: CT Needle Biopsy Muscle  -     CT Chest With Contrast Diagnostic; Standing  -     CT Abdomen Pelvis With Contrast; Standing  -     CT Chest With Contrast Diagnostic  -     CT Abdomen Pelvis With Contrast  -     Basic Metabolic Panel  -     CBC (No Diff)  -     Vital Signs  -      Vital Signs  -     Vital Signs  -     Vital Signs  -     Vital Signs  -     Vital Signs  -     Intake & Output  -     Intake & Output  -     Daily Weights  -     Oral Care  -     Oral Care  -     US Guided Tissue Biopsy; Standing  -     US Guided Tissue Biopsy  -     lidocaine (XYLOCAINE) 1 % injection 10 mL  -     Tissue Pathology Exam  -     Non-gynecologic Cytology; Standing  -     Non-gynecologic Cytology  -     iopamidol (ISOVUE-300) 61 % injection 100 mL  -     Cancel: Discharge patient; Standing  -     pantoprazole (PROTONIX) 40 MG EC tablet; Take 1 tablet by mouth Every Morning for 30 days.  Dispense: 30 tablet; Refill: 0  -     Cancel: Discharge patient  -     Hold medication  -     Obtain Informed Consent; Standing  -     US Thoracentesis; Standing  -     Body Fluid Cell Count With Differential - Pleural Fluid, Pleural Cavity; Standing  -     Flow Cytometry; Standing  -     Glucose, Body Fluid - Pleural Fluid, Pleural Cavity; Standing  -     Body Fluid Culture - Body Fluid, Pleural Cavity; Standing  -     Anaerobic Culture - Pleural Fluid, Pleural Cavity; Standing  -     US Thoracentesis  -     CBC & Differential; Standing  -     Basic Metabolic Panel; Standing  -     NPO Diet NPO Type: Strict NPO; Standing  -     CBC & Differential  -     Basic Metabolic Panel  -     NPO Diet NPO Type: Strict NPO  -     Vital Signs  -     Vital Signs  -     Vital Signs  -     Vital Signs  -     Vital Signs  -     Vital Signs  -     Intake & Output  -     Intake & Output  -     Daily Weights  -     Oral Care  -     Oral Care  -     Hematology & Oncology Inpatient Consult; Standing  -     Inpatient Radiation Oncology Consult; Standing  -     Hematology & Oncology Inpatient Consult  -     Inpatient Radiation Oncology Consult        Assessment:    Enrique Gillette is a 89 y.o. male with a right thigh mass that has been growing and is concerning for underlying malignancy. 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    Plan:    - Return visit in 1 week to discuss treatment options    The patient's medical history, presentation, and work-up to date were discussed.  Concern for underlying malignancy was discussed as well as preliminary pathology.  Standard of care for definitive treatment options were discussed.  We reviewed that definitive therapy for sarcoma consisted of some order of radiation and surgery plus or minus systemic therapy.  I discussed my preference is preoperative radiation followed by surgery plus or minus systemic therapy is indicated.  Given the patient's age we also discussed palliative options should he not want to be overly aggressive.  We discussed options of radiation alone, a combination of palliative surgery and palliative radiation, or consideration of no further treatment with the primary focus on quality of life.  The patient and his family would like to think about their options.  We will review additional information at his upcoming appointment.  Based on the patient's preferences at that time we will finalize a treatment path moving forward.  The patient is encouraged to reach out to our office with any questions or concerns that arise prior to his next appointment.       I spent 60 minutes caring for Enrique on this date of service. This time includes time spent by me in the following activities:preparing for the visit, reviewing tests, obtaining and/or reviewing a separately obtained history, performing a medically appropriate examination and/or evaluation , referring and communicating with other health care professionals , and documenting information in the medical record  Follow Up   No follow-ups on file.  Patient was given instructions and counseling regarding his condition or for health maintenance advice. Please see specific information pulled into the AVS if appropriate.     Brian Manning MD

## 2023-09-15 NOTE — PLAN OF CARE
Vss, RA, A&O but forgetful, up with standby assist and walker, daughter at bedside. No c/o pain. Went down for thoracentesis today and got 600cc fluid removed. New consults for rad/onc and hem/onc this shift.

## 2023-09-15 NOTE — PROGRESS NOTES
Name: Enrique Gillette ADMIT: 2023   : 1930  PCP: Nazanin Lucio MD    MRN: [id number] LOS: 3 days   AGE/SEX: 89 y.o. male  ROOM: [facility]     Subjective   Subjective     No events overnight. He's anxious to go home. He denies any infectious symptoms today.        Objective   Objective   Vital Signs  Temp:  [97.2 °F (36.2 °C)-98.4 °F (36.9 °C)] 97.3 °F (36.3 °C)  Heart Rate:  [] 80  Resp:  [18] 18  BP: ()/(56-75) 101/60  SpO2:  [91 %-97 %] 97 %  on   ;   Device (Oxygen Therapy): room air  Body mass index is 25.26 kg/m².  Physical Exam  Constitutional:       General: He is not in acute distress.     Appearance: He is not toxic-appearing.   Cardiovascular:      Rate and Rhythm: Normal rate and regular rhythm.      Heart sounds: Normal heart sounds.   Pulmonary:      Effort: Pulmonary effort is normal.      Breath sounds: Normal breath sounds.   Abdominal:      General: Bowel sounds are normal.      Palpations: Abdomen is soft.   Musculoskeletal:      Comments: Right thigh mass   Neurological:      Mental Status: He is alert.   Psychiatric:         Mood and Affect: Mood normal.         Behavior: Behavior normal.       Results Review     I reviewed the patient's new clinical results.  Results from last 7 days   Lab Units 09/15/23  0639 23  1021   WBC 10*3/mm3 14.96* 11.34*  --  10.66 11.23*   HEMOGLOBIN g/dL 9.9* 9.2* 9.4* 9.3* 9.8*   PLATELETS 10*3/mm3 301 297  --  300 302     Results from last 7 days   Lab Units 09/15/23  0639 23  0723  0723  1021   SODIUM mmol/L 138 140 139 141   POTASSIUM mmol/L 4.4 4.1 4.2 4.0   CHLORIDE mmol/L 106 109* 110* 109*   CO2 mmol/L 22.9 23.1 22.1 22.0   BUN mg/dL 17 18 19 22   CREATININE mg/dL 1.04 0.88 1.00 1.06   GLUCOSE mg/dL 100* 91 93 109*   Estimated Creatinine Clearance: 46.6 mL/min (by C-G formula based on SCr of 1.04 mg/dL).  Results from last 7 days   Lab Units  09/12/23  1021   ALBUMIN g/dL 2.9*   BILIRUBIN mg/dL 0.5   ALK PHOS U/L 153*   AST (SGOT) U/L 27   ALT (SGPT) U/L 15     Results from last 7 days   Lab Units 09/15/23  0639 09/14/23  0700 09/13/23  0714 09/12/23  1021   CALCIUM mg/dL 8.3 8.6 8.2 8.9   ALBUMIN g/dL  --   --   --  2.9*   MAGNESIUM mg/dL  --   --  2.0  --    PHOSPHORUS mg/dL  --   --  3.1  --      No results found for: COVID19  No results found for: HGBA1C, POCGLU    US Thoracentesis  Narrative: ULTRASOUND-GUIDED RIGHT THORACENTESIS. 9/15/2023     HISTORY: Pleural effusion.     After signed informed consent was obtained the patient was prepped and  draped in the upright sitting position. Lidocaine was used for local  anesthesia.     Ultrasound guidance was used to place the thoracentesis catheter into  the right pleural fluid collection. 600 cc was removed.     Confirmatory images were obtained.     Patient tolerated the procedure well with no complications.     Impression: Ultrasound-guided right thoracentesis as described.           This report was finalized on 9/15/2023 12:01 PM by Dr. Deven Zhang M.D.       Scheduled Medications  pantoprazole, 40 mg, Oral, Q AM  rosuvastatin, 10 mg, Oral, Daily  senna-docusate sodium, 2 tablet, Oral, BID  sodium chloride, 10 mL, Intravenous, Q12H  tamsulosin, 0.4 mg, Oral, Nightly    Infusions  hold, 1 each    Diet  Diet: Regular/House Diet; Texture: Regular Texture (IDDSI 7); Fluid Consistency: Thin (IDDSI 0)       Assessment/Plan     Active Hospital Problems    Diagnosis  POA    **Mass of right lower extremity [R22.41]  Yes    Pleural effusion, right [J90]  Unknown    HLD (hyperlipidemia) [E78.5]  Unknown    Chronic back pain [M54.9, G89.29]  Unknown    Anemia [D64.9]  Unknown      Resolved Hospital Problems   No resolved problems to display.       89 y.o. male admitted with Mass of right lower extremity.    Patient is a 89-year-old male with a history of including but not limited to chronic back pain,  hyperlipidemia, skin cancer, presented to the ED complaining of worsening right leg pain and swelling.       Right thigh mass:   -Duplex ultrasound negative for DVT.  -CT scan of the right lower extremity shows significant enlargement in the intramuscular anterolateral right thigh lesion in the vastus lateralis muscle  The enlargement appears to be primarily due to increased volume of fluid within the lesion. An irregular rind of thickened tissue around the periphery of the lesion is again observed and as stated previously, differential of chronic hematoma versus hemorrhagic solid/cystic tumor remains.  -s/p biopsy on 9/14/23. Preliminary pathology suggests a sarcoma.  -orthopedic surgery, oncology, and radiation oncology are following     Right pleural effusion:  -s/p u/s guided thoracentesis on 9/15/23 with 600 cc removed  -it doesn't appear that ph, protein, or LDH were ordered and I've added these on. Hopefully they'll be able to be done.   -cytology is pending  -repeat chest x-ray in the morning     Leukocytosis:  -no infectious symptoms. Monitor     Anemia of chronic disease:  -hgb is stable  -check b12 and folate      Spinal stenosis of lumbar region/chronic back pain: Continue cheduled Tylenol, as needed oxycodone for moderate pain.  Bowel regimen.    Hyperlipidemia: statin     Insomnia: Continue trazodone    BPH: tamsulosin    GERD: PPI    SCDs for DVT prophylaxis.  Limited code - no intubation .  Discussed with patient and family.  Anticipate discharge home in 1-2 days. If ok with all       Tyrell Cueto MD  [facility]  09/15/23  17:09 EDT    I wore protective equipment throughout this patient encounter including a face mask, gloves and protective eyewear.  Hand hygiene was performed before donning protective equipment and after removal when leaving the room.

## 2023-09-15 NOTE — PROGRESS NOTES
Was unfortunately unable to see the patient at bedside as he was in radiology undergoing the pleurocentesis today however did discuss the patient's case with his daughter who was in the radiology holding area.  CT scan of the chest abdomen and pelvis were reviewed.  Discussed the initial findings of the core needle biopsy and reviewed the pathology slides with the pathologist.  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  This was conveyed to the patient's daughter.    I discussed with her how soft tissue sarcoma is previously addressed that being with surgical resection radiation therapy either preoperatively or postoperatively.  I discussed there may be some benefit to immunotherapy.  I stated that given the patient's age I would recommend against any cytotoxic chemotherapy.    Understanding the patient's age and the extent of the oncologic surgical resection does create some concern for the daughter given his age and need for general anesthesia.  I informed her that the surgery would take approximately 3 to 4 hours to complete possibly longer and that he would need to be under general anesthesia for that surgery.  We discussed radiation therapy as a treatment modality alone.  I explained that sometimes radiation can cause an increase in size of the mass and that sometimes it may also cause bone marrow suppression and skin complications.  I explained that radiation therapy will not take care of any micrometastatic disease.  I also explained that it is unlikely to kill off the whole tumor.  Finally we discussed a less invasive procedure.  The tumor itself is very necrotic.  I discussed a small incision over the mass with the plan to decompress the necrotic portion.  The goal of this procedure will be palliative intent only.  I explained that this would decrease the swelling in the thigh which would in turn decrease the risk of venous compression and development of DVT and persistent lower extremity edema.   I did explain that this may cause a new site of disease to pop up at the site of surgery.  Additionally we discussed possibly radiating the area after this procedure and its associated risks.    The patient's daughter wants to discuss this obviously with her father as well as with the rest of the family.  I explained that we will have to wait for the final path before we will proceed with any interventions at this time which she understood.  She was provided with my office number and understands I will be out of town for the next week.  We will plan to connect once the final pathology has been obtained and I am back in town.    Mark Vargas M.D.

## 2023-09-15 NOTE — CONSULTS
Subjective     REASON FOR CONSULTATION:  1. 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  2. RIGHT PLEURAL EFFUSION, S/P THORACENTESIS, NO OBVIOUS PULMONARY NODULES.  3. ANEMIA FOR FURTHER INVESTIGATION.  4. COGNITIVE DECLINE IN THE LAST 2 YEARS, VERY DIFFICULT TASK OF TAKING CARE OF HIS WIFE WHO HAS DEMENTIA REQUIRING TOTAL CARE.  Provide an opinion on any further workup or treatment                             REQUESTING PHYSICIAN:    Tyrell Cueto MD      Attending     Since 9/15/2023     [phone]     RECORDS OBTAINED:  Records of the patients history including those obtained from the referring provider were reviewed and summarized in detail.      History of Present Illness On 09/15/2023 I have been asked to see this patient in consultation, 89-year-old white male who has been in the hospital now for almost 4 days. Happens to be that his daughter was at the bedside and she was able to provide me very useful information. This patient lives at home with his wife who has dementia and he is the total care for her. He is the one doing the shopping, the cooking, buying the food, the cleaning and the laundry. He drives the car and runs her around on daily basis. In any event, the patient has had development since 01/2023 of an enlarging progressive mass with no pain in the anterior aspect of the right thigh. He was seen by his primary physician and Orthopedic Medicine. Radiological assessment was done through an MRI in 03/2023 that documented abnormalities consistent with tumor necrosis surrounding the tumor. Decision was made to leave this in observation and it was thought that he could have also a hematoma. In any event, the tumor continued progressing and slowly growing. He has not developed any pain but now is getting to the point that is substantially visible and is uncomfortable to the patient's view. He has had  occasional swelling in the right lower extremity but when he lies in the bed the swelling goes away. The patient has declined in regard to his weight. He has lost almost 30 pounds of weight in the last year and his nutrition is not appropriate. He lives on foods by the grocery stores and restaurants all the time, they are no longer cooking. The patient has not had any difficulty with his eyes or oral mucosas. His mind is slowly coming down in regard to memory and ability to function in a normal way. He has been a very sharp individual all his life. He has had also some hacking cough with no shortness of breath, pleuritic pain or hemoptysis. He was found to have a pleural effusion bilaterally at the time of the admission, right more than left, and he has undergone since then a thoracentesis. He has had decline in his ability to eat, again losing weight, 8 pounds in the last year. He has proper bowel activity, proper continence, proper urination, occasional urinary incontinence. Frequency to urinate and nocturia are present. No hematuria. He has not had any pain in the bones or skeleton. He has had as posted, mental decline. His memory is not as good as used to be and he is not the same strong man as used to be before. In spite of this he continues reluctantly the care of his wife who has dementia and he takes her for a ride in the car on daily basis.     The patient has not had any other new issues. He denies any fevers, chills or infection. He has not had any falls.     He has children who are now assisting them under these circumstances. The daughter who is with him in the room typically lives in [place].        Past Medical History:   Diagnosis Date    Chest pain     Hyperlipidemia     Prostate CA     Prostate cancer     RBBB     Right knee pain     SOB (shortness of breath)         Past Surgical History:   Procedure Laterality Date    CATARACT EXTRACTION Bilateral     COLONOSCOPY  2015        No current  facility-administered medications on file prior to encounter.     Current Outpatient Medications on File Prior to Encounter   Medication Sig Dispense Refill    acetaminophen (TYLENOL) 500 MG tablet Take 1 tablet by mouth Every 6 (Six) Hours As Needed for Mild Pain.      NON FORMULARY Omega 3 joint pain      rosuvastatin (CRESTOR) 10 MG tablet TAKE 1 TABLET DAILY 90 tablet 3        ALLERGIES:  No Known Allergies     Social History     Socioeconomic History    Marital status:    Tobacco Use    Smoking status: Never   Vaping Use    Vaping Use: Never used   Substance and Sexual Activity    Alcohol use: No    Drug use: No    Sexual activity: Never        Family History   Problem Relation Age of Onset    No Known Problems Mother     No Known Problems Father     No Known Problems Brother     No Known Problems Brother     No Known Problems Brother     No Known Problems Brother         Review of Systems   Constitutional:  Positive for activity change, appetite change, fatigue and unexpected weight change.   HENT: Negative.     Respiratory:  Positive for cough.    Cardiovascular: Negative.    Gastrointestinal: Negative.    Genitourinary:  Positive for difficulty urinating, enuresis and frequency.   Musculoskeletal:  Positive for back pain.   Skin:  Positive for color change.   Neurological: Negative.    Hematological: Negative.    Psychiatric/Behavioral: Negative.        Objective     Vitals:    09/15/23 0900 09/15/23 0954 09/15/23 1037 09/15/23 1421   BP: 125/75 115/71 131/75 101/60   BP Location: Left arm Left arm Right arm Right arm   Patient Position: Lying Lying Lying Lying   Pulse: 94 89 85 80   Resp: 18 18 18 18   Temp:   97.9 °F (36.6 °C) 97.3 °F (36.3 °C)   TempSrc:   Oral Oral   SpO2: 94% 97%     Weight:              No data to display                Physical Exam  Constitutional:       Appearance: He is ill-appearing.   HENT:      Head: Normocephalic.      Nose: Nose normal.      Mouth/Throat:      Mouth:  Mucous membranes are moist.      Pharynx: Oropharynx is clear. No oropharyngeal exudate or posterior oropharyngeal erythema.   Eyes:      General: No scleral icterus.     Extraocular Movements: Extraocular movements intact.      Pupils: Pupils are equal, round, and reactive to light.   Cardiovascular:      Rate and Rhythm: Normal rate and regular rhythm.      Pulses: Normal pulses.      Heart sounds: Normal heart sounds.   Pulmonary:      Effort: Pulmonary effort is normal.      Breath sounds: Normal breath sounds.      Comments: DECREASED BS BOTH BASES  Abdominal:      General: Abdomen is flat. There is no distension.      Palpations: Abdomen is soft. There is no mass.      Tenderness: There is no abdominal tenderness. There is no guarding or rebound.      Hernia: No hernia is present.   Musculoskeletal:         General: Deformity present.      Comments: ALMOST 20 CM MASS ANTERIOR ASPECT R THIGH, NONE TENDER, NOT MOBILE, NO FLUCTUATION, ABUNDANT SURFACE COLLATERAL CIRCULATION, INCREASE IN LOCAL TEMPERATURE NO ERYTHEMA   Lymphadenopathy:      Cervical: No cervical adenopathy.   Skin:     General: Skin is warm and dry.      Coloration: Skin is pale.   Neurological:      Mental Status: He is alert and oriented to person, place, and time.   Psychiatric:         Mood and Affect: Mood normal.         Behavior: Behavior normal.         Thought Content: Thought content normal.         Judgment: Judgment normal.         RECENT LABS:  Hematology WBC   Date Value Ref Range Status   09/15/2023 14.96 (H) 3.40 - 10.80 10*3/mm3 Final     RBC   Date Value Ref Range Status   09/15/2023 3.91 (L) 4.14 - 5.80 10*6/mm3 Final     Hemoglobin   Date Value Ref Range Status   09/15/2023 9.9 (L) 13.0 - 17.7 g/dL Final     Hematocrit   Date Value Ref Range Status   09/15/2023 32.1 (L) 37.5 - 51.0 % Final     Platelets   Date Value Ref Range Status   09/15/2023 301 140 - 450 10*3/mm3 Final        Examination: CT of the chest, abdomen, and  pelvis with contrast     TECHNIQUE: CT of the chest, abdomen, and pelvis after the uneventful  administration of nonionic intravenous contrast per protocol. Radiation  dose reduction techniques were utilized, including automated exposure  control and exposure modulation based on body size.     HISTORY: Metastatic disease evaluation. Musculoskeletal neoplasm in the  right thigh     COMPARISON:None available     FINDINGS: Chest:     There are moderate sized right and small left pleural effusions.  Dependent atelectasis is seen, without consolidation or pneumothorax.  There is old granulomatous disease. The central airways are patent.     No enlarged supraclavicular, axillary, mediastinal, or hilar lymph nodes  are seen. The mediastinal vasculature is normal in caliber. There are  coronary calcifications. The heart is mildly enlarged, without  pericardial effusion.     Abdomen and pelvis:     Old granulomatous disease is seen in the liver and spleen.  Low-attenuation right renal lesions are technically indeterminate,  likely cysts. The left kidney is absent. There is a left-sided urinary  bladder diverticulum. A pancreatic calcification is compatible with  chronic pancreatitis.     The liver, gallbladder, spleen, adrenal glands,, stomach, small bowel,  appendix, large bowel, and abdominal vasculature are normal. No  intraperitoneal fluid collection or free gas are seen. No enlarged lymph  nodes are demonstrated.     Bone windows demonstrate degenerative changes, without suspicious  osseous lesion seen.     There is an incompletely evaluated right thigh mass, measuring 8 x 5.3  cm on series 2, image 105.     IMPRESSION:  1. Incompletely evaluated right thigh mass     2. Moderate size right and small left pleural effusions     3. Incidental findings as above, without convincing evidence of  metastatic disease in the chest, abdomen, or pelvis.        This report was finalized on 9/14/2023 2:50 PM by Dr. Tony Foley,  M.D.     Assessment & Plan     On 09/15/2023 I have a 89-year-old male who has been taking care of his ill wife for many years who has dementia who runs the house, cleans, washes, buys groceries, buys meals, drives the car, pays the bills. 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 Obviously, there is a remote possibility that this could be representation of a lymphoma, extralymphatic.     The patient on top of that has radiologically speaking bilateral pleural effusions. He has no obvious congestive heart failure and the question is the effusion a manifestation of malignancy or manifestation of some other process. Also on top of that the patient has significant anemia. He has lost weight and his cognitive abilities have declined in the last 3 years. Even though the patient is still able to run the house, he is losing ground a little bit at a time day by day and he really wants to go home to be sure that he fulfills his commitment to his wife in regard to the care of her dementia. The kids are now assisting into all these issues.     Biopsy has been done from the muscle and from the tumor in the right thigh and pathological analysis is pending.     My commentary is typically I would expect in regard to sarcomas of soft tissues to have metastasis in lungs in the form of nodules and not in the form of effusions. That caught my attention in regard if the effusion that he has in both hemithoraces is representation of a different process. I think it is worth it to check a proBNP and check a TSH on this patient.     Also, the patient has lost weight. His nutrition is relatively poor and I think he has anemia. I think it is worth it to do an anemia workup that includes ferritin, iron, TIBC, B12, folic acid, reticulated hemoglobin as  well as a serum protein immunoelectrophoresis. I will run a sedimentation rate, an LDH and a C-reactive protein as well.     I discussed with the patient and his daughter present in the room the fact that the management of sarcoma in a 89-year-old individual it is very different than sarcoma management in a patient who is less than 65 and has a good performance status and minimal comorbidities. Typically, the management should be an aggressive plan of neoadjuvant chemotherapy followed by resection followed by further treatment. Most important of all in my opinion now days is the proper classification of the sarcoma and analysis of Next Generation Sequencing in regard to tissue analysis. Analysis of the chromosomes in the tissue also is crucial. There are centers where these sarcomas are analyzed and defined. Being in training a long time ago in a sarcoma team at [facility], we used to define sarcomas by morphological criteria, now they are defined by molecular and genetic criteria. The abundance of sarcomas, different kinds is now days incredible. Therefore, I think a proper analysis is deserving. I will take care of sending the tissue for analysis for Next Generation Sequencing on this patient to [facility].     We will go ahead and send the analysis of the fluid from the pleural effusion for protein, LDH and cytology. I will run a proBNP and a TSH as posted.     We are awaiting the report from the pathology.     I discussed all these facts with the patient and his daughter. I think the patient could go home tomorrow and I will be glad to see him in the office next week. Resume at the time the conversation about the management of his condition. Again we have a 89-year-old individual who needs to be at home to take care of his wife and that is his desire. I think we have to consider that the management of sarcoma in this kind of patient is very different as I posted from a patient who is younger and has  minimal comorbidities.     Obviously, this patient realizes that his health is slowly evolving into the worse and his kids are going to need to assist him to assist also his wife in the care that she requires under the present circumstances. It is a calamity seeing all this happening but is in front of our eyes and we need to assist them in the best way that we can. I also will involve oncology social worker into the management of these situations as well.

## 2023-09-16 ENCOUNTER — APPOINTMENT (OUTPATIENT)
Dept: GENERAL RADIOLOGY | Facility: HOSPITAL | Age: 88
DRG: 844 | End: 2023-09-16
Payer: MEDICARE

## 2023-09-16 LAB
ANION GAP SERPL CALCULATED.3IONS-SCNC: 9.5 MMOL/L (ref 5–15)
BILIRUB UR QL STRIP: NEGATIVE
BUN SERPL-MCNC: 20 MG/DL (ref 8–23)
BUN/CREAT SERPL: 22.2 (ref 7–25)
CALCIUM SPEC-SCNC: 8.5 MG/DL (ref 8.2–9.6)
CHLORIDE SERPL-SCNC: 108 MMOL/L (ref 98–107)
CLARITY UR: CLEAR
CO2 SERPL-SCNC: 22.5 MMOL/L (ref 22–29)
COLOR UR: YELLOW
CREAT SERPL-MCNC: 0.9 MG/DL (ref 0.76–1.27)
DEPRECATED RDW RBC AUTO: 43.7 FL (ref 37–54)
EGFRCR SERPLBLD CKD-EPI 2021: 79.6 ML/MIN/1.73
ERYTHROCYTE [DISTWIDTH] IN BLOOD BY AUTOMATED COUNT: 15.1 % (ref 12.3–15.4)
GLUCOSE SERPL-MCNC: 102 MG/DL (ref 65–99)
GLUCOSE UR STRIP-MCNC: NEGATIVE MG/DL
HCT VFR BLD AUTO: 30.8 % (ref 37.5–51)
HGB BLD-MCNC: 9.6 G/DL (ref 13–17.7)
HGB UR QL STRIP.AUTO: NEGATIVE
KETONES UR QL STRIP: NEGATIVE
LEUKOCYTE ESTERASE UR QL STRIP.AUTO: NEGATIVE
MCH RBC QN AUTO: 25.1 PG (ref 26.6–33)
MCHC RBC AUTO-ENTMCNC: 31.2 G/DL (ref 31.5–35.7)
MCV RBC AUTO: 80.4 FL (ref 79–97)
NITRITE UR QL STRIP: NEGATIVE
NT-PROBNP SERPL-MCNC: 2847 PG/ML (ref 0–1800)
PH UR STRIP.AUTO: 6 [PH] (ref 5–8)
PLATELET # BLD AUTO: 284 10*3/MM3 (ref 140–450)
PMV BLD AUTO: 11.1 FL (ref 6–12)
POTASSIUM SERPL-SCNC: 3.8 MMOL/L (ref 3.5–5.2)
PROT UR QL STRIP: NEGATIVE
RBC # BLD AUTO: 3.83 10*6/MM3 (ref 4.14–5.8)
SODIUM SERPL-SCNC: 140 MMOL/L (ref 136–145)
SP GR UR STRIP: 1.01 (ref 1–1.03)
UROBILINOGEN UR QL STRIP: NORMAL
VIT B12 BLD-MCNC: 579 PG/ML (ref 211–946)
WBC NRBC COR # BLD: 14.96 10*3/MM3 (ref 3.4–10.8)

## 2023-09-16 PROCEDURE — 82607 VITAMIN B-12: CPT | Performed by: STUDENT IN AN ORGANIZED HEALTH CARE EDUCATION/TRAINING PROGRAM

## 2023-09-16 PROCEDURE — 87040 BLOOD CULTURE FOR BACTERIA: CPT | Performed by: STUDENT IN AN ORGANIZED HEALTH CARE EDUCATION/TRAINING PROGRAM

## 2023-09-16 PROCEDURE — 71045 X-RAY EXAM CHEST 1 VIEW: CPT

## 2023-09-16 PROCEDURE — 80048 BASIC METABOLIC PNL TOTAL CA: CPT | Performed by: STUDENT IN AN ORGANIZED HEALTH CARE EDUCATION/TRAINING PROGRAM

## 2023-09-16 PROCEDURE — 81003 URINALYSIS AUTO W/O SCOPE: CPT | Performed by: STUDENT IN AN ORGANIZED HEALTH CARE EDUCATION/TRAINING PROGRAM

## 2023-09-16 PROCEDURE — 85027 COMPLETE CBC AUTOMATED: CPT | Performed by: STUDENT IN AN ORGANIZED HEALTH CARE EDUCATION/TRAINING PROGRAM

## 2023-09-16 PROCEDURE — 83880 ASSAY OF NATRIURETIC PEPTIDE: CPT | Performed by: STUDENT IN AN ORGANIZED HEALTH CARE EDUCATION/TRAINING PROGRAM

## 2023-09-16 PROCEDURE — 99233 SBSQ HOSP IP/OBS HIGH 50: CPT | Performed by: INTERNAL MEDICINE

## 2023-09-16 RX ADMIN — Medication 10 ML: at 20:24

## 2023-09-16 RX ADMIN — TRAZODONE HYDROCHLORIDE 25 MG: 50 TABLET ORAL at 20:23

## 2023-09-16 RX ADMIN — SENNOSIDES AND DOCUSATE SODIUM 2 TABLET: 50; 8.6 TABLET ORAL at 09:49

## 2023-09-16 RX ADMIN — ROSUVASTATIN CALCIUM 10 MG: 10 TABLET, FILM COATED ORAL at 09:49

## 2023-09-16 RX ADMIN — Medication 10 ML: at 09:49

## 2023-09-16 RX ADMIN — TAMSULOSIN HYDROCHLORIDE 0.4 MG: 0.4 CAPSULE ORAL at 20:24

## 2023-09-16 RX ADMIN — PANTOPRAZOLE SODIUM 40 MG: 40 TABLET, DELAYED RELEASE ORAL at 06:20

## 2023-09-16 NOTE — PROGRESS NOTES
Name: Enrique Gillette ADMIT: 2023   : 1930  PCP: Nazanin Lucio MD    MRN: [id number] LOS: 4 days   AGE/SEX: 89 y.o. male  ROOM: [facility]     Subjective   Subjective     He is anxious to leave the hospital, but he had a fever to 101 last night and was somewhat hypotensive overnight. I again went through an extensive ROS with him without any pertinent positives. Chest xray this morning without pneumothorax        Objective   Objective   Vital Signs  Temp:  [96.8 °F (36 °C)-101 °F (38.3 °C)] 97 °F (36.1 °C)  Heart Rate:  [80-97] 80  Resp:  [16-18] 18  BP: ()/(44-65) 110/65  SpO2:  [94 %-98 %] 98 %  on   ;   Device (Oxygen Therapy): room air  Body mass index is 25.13 kg/m².  Physical Exam  Constitutional:       General: He is not in acute distress.     Appearance: He is not toxic-appearing.   Cardiovascular:      Rate and Rhythm: Normal rate and regular rhythm.      Heart sounds: Normal heart sounds.   Pulmonary:      Effort: Pulmonary effort is normal.      Breath sounds: Normal breath sounds.   Abdominal:      General: Bowel sounds are normal.      Palpations: Abdomen is soft.   Musculoskeletal:      Comments: Right thigh mass   Neurological:      Mental Status: He is alert.   Psychiatric:         Mood and Affect: Mood normal.         Behavior: Behavior normal.       Results Review     I reviewed the patient's new clinical results.  Results from last 7 days   Lab Units 23  0651 09/15/23  0639 09/14/23  0700 09/13/23  1935 09/13/23  0714   WBC 10*3/mm3 14.96* 14.96* 11.34*  --  10.66   HEMOGLOBIN g/dL 9.6* 9.9* 9.2* 9.4* 9.3*   PLATELETS 10*3/mm3 284 301 297  --  300       Results from last 7 days   Lab Units 23  0651 09/15/23  0639 23  0723  0714   SODIUM mmol/L 140 138 140 139   POTASSIUM mmol/L 3.8 4.4 4.1 4.2   CHLORIDE mmol/L 108* 106 109* 110*   CO2 mmol/L 22.5 22.9 23.1 22.1   BUN mg/dL 20 17 18 19   CREATININE mg/dL 0.90 1.04 0.88 1.00   GLUCOSE mg/dL 102*  How Severe Is Your Skin Lesion?: mild Negative fit test, good 100* 91 93     Estimated Creatinine Clearance: 53.6 mL/min (by C-G formula based on SCr of 0.9 mg/dL).  Results from last 7 days   Lab Units 09/12/23  1021   ALBUMIN g/dL 2.9*   BILIRUBIN mg/dL 0.5   ALK PHOS U/L 153*   AST (SGOT) U/L 27   ALT (SGPT) U/L 15       Results from last 7 days   Lab Units 09/16/23  0651 09/15/23  0639 09/14/23  0700 09/13/23  0714 09/12/23  1021   CALCIUM mg/dL 8.5 8.3 8.6 8.2 8.9   ALBUMIN g/dL  --   --   --   --  2.9*   MAGNESIUM mg/dL  --   --   --  2.0  --    PHOSPHORUS mg/dL  --   --   --  3.1  --        No results found for: COVID19  No results found for: HGBA1C, POCGLU    XR Chest 1 View  ONE-VIEW PORTABLE CHEST     HISTORY: Follow-up evaluation after right thoracentesis.     FINDINGS: The lungs are clear except for some minimal vague atelectasis  at the left base and the moderate right pleural effusion noted on the CT  scan performed 2 days ago is no longer visible. There is no pneumothorax  following thoracentesis. The heart remains enlarged.     This report was finalized on 9/16/2023 7:45 AM by Dr. Raman Faulkner M.D.       Scheduled Medications  pantoprazole, 40 mg, Oral, Q AM  rosuvastatin, 10 mg, Oral, Daily  senna-docusate sodium, 2 tablet, Oral, BID  sodium chloride, 10 mL, Intravenous, Q12H  tamsulosin, 0.4 mg, Oral, Nightly    Infusions  hold, 1 each    Diet  Diet: Regular/House Diet; Texture: Regular Texture (IDDSI 7); Fluid Consistency: Thin (IDDSI 0)       Assessment/Plan     Active Hospital Problems    Diagnosis  POA    **Mass of right lower extremity [R22.41]  Yes    Pleural effusion, right [J90]  Unknown    HLD (hyperlipidemia) [E78.5]  Unknown    Chronic back pain [M54.9, G89.29]  Unknown    Anemia [D64.9]  Unknown      Resolved Hospital Problems   No resolved problems to display.       89 y.o. male admitted with Mass of right lower extremity.    Patient is a 89-year-old male with a history of including but not limited to chronic back pain, hyperlipidemia, skin  Have Your Skin Lesions Been Treated?: not been treated Is This A New Presentation, Or A Follow-Up?: Skin Lesions cancer, presented to the ED complaining of worsening right leg pain and swelling.       Right thigh mass:   -Duplex ultrasound negative for DVT.  -CT scan of the right lower extremity shows significant enlargement in the intramuscular anterolateral right thigh lesion in the vastus lateralis muscle  The enlargement appears to be primarily due to increased volume of fluid within the lesion. An irregular rind of thickened tissue around the periphery of the lesion is again observed and as stated previously, differential of chronic hematoma versus hemorrhagic solid/cystic tumor remains.  -s/p biopsy on 9/14/23. Preliminary pathology suggests a sarcoma.  -orthopedic surgery, oncology, and radiation oncology have evaluated     Right pleural effusion:  -s/p u/s guided thoracentesis on 9/15/23 with 600 cc of transudative removed  -cytology is pending  -add a bnp on to this morning's labs, but he really does appear euvolemic  -repeat chest x-ray this morning without pneumothorax     Fevers/hypotension/leukocytosis:  -no infectious symptoms  -chest x-ray was clear  -check blood cultures and a urinalysis and monitor  -he is very anxious to go home and I currently have no source of infection, so if his vitals remain ok and we do not find a source, then we can consider discharge in the morning with close pcp follow up    Anemia of chronic disease:  -hgb is stable     Spinal stenosis of lumbar region/chronic back pain:   -Continue cheduled Tylenol, as needed oxycodone for moderate pain.  Bowel regimen.    Hyperlipidemia: statin     Insomnia: Continue trazodone    BPH: tamsulosin    GERD: PPI    SCDs for DVT prophylaxis.  Limited code - no intubation .  Discussed with patient and family.  Anticipate discharge home in 1-2 days.       Tyrell Cueto MD  [facility]  09/16/23  10:37 EDT    I wore protective equipment throughout this patient encounter including a face mask, gloves and protective eyewear.  Hand  hygiene was performed before donning protective equipment and after removal when leaving the room.

## 2023-09-16 NOTE — PROGRESS NOTES
Subjective  1. 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  2. RIGHT PLEURAL EFFUSION, S/P THORACENTESIS, NO OBVIOUS PULMONARY NODULES.  3. ANEMIA FOR FURTHER INVESTIGATION.  4. COGNITIVE DECLINE IN THE LAST 2 YEARS, VERY DIFFICULT TASK OF TAKING CARE OF HIS WIFE WHO HAS DEMENTIA REQUIRING TOTAL CARE.      History of Present Illness  On 09/15/2023 I have been asked to see this patient in consultation, 89-year-old white male who has been in the hospital now for almost 4 days. Happens to be that his daughter was at the bedside and she was able to provide me very useful information. This patient lives at home with his wife who has dementia and he is the total care for her. He is the one doing the shopping, the cooking, buying the food, the cleaning and the laundry. He drives the car and runs her around on daily basis. In any event, the patient has had development since 01/2023 of an enlarging progressive mass with no pain in the anterior aspect of the right thigh. He was seen by his primary physician and Orthopedic Medicine. Radiological assessment was done through an MRI in 03/2023 that documented abnormalities consistent with tumor necrosis surrounding the tumor. Decision was made to leave this in observation and it was thought that he could have also a hematoma. In any event, the tumor continued progressing and slowly growing. He has not developed any pain but now is getting to the point that is substantially visible and is uncomfortable to the patient's view. He has had occasional swelling in the right lower extremity but when he lies in the bed the swelling goes away. The patient has declined in regard to his weight. He has lost almost 30 pounds of weight in the last year and his nutrition is not appropriate. He lives on foods by the grocery stores and restaurants all the time, they are no longer cooking. The patient has not  had any difficulty with his eyes or oral mucosas. His mind is slowly coming down in regard to memory and ability to function in a normal way. He has been a very sharp individual all his life. He has had also some hacking cough with no shortness of breath, pleuritic pain or hemoptysis. He was found to have a pleural effusion bilaterally at the time of the admission, right more than left, and he has undergone since then a thoracentesis. He has had decline in his ability to eat, again losing weight, 8 pounds in the last year. He has proper bowel activity, proper continence, proper urination, occasional urinary incontinence. Frequency to urinate and nocturia are present. No hematuria. He has not had any pain in the bones or skeleton. He has had as posted, mental decline. His memory is not as good as used to be and he is not the same strong man as used to be before. In spite of this he continues reluctantly the care of his wife who has dementia and he takes her for a ride in the car on daily basis.      The patient has not had any other new issues. He denies any fevers, chills or infection. He has not had any falls.      He has children who are now assisting them under these circumstances. The daughter who is with him in the room typically lives in [place].      On 09/16/2023 the patient was further reviewed. He has been transferred to . He feels happy. He is well fed. He is eating his meals and he is happy with the care that he is receiving. He has been advised by the primary team to stay 1 extra day. Laboratory workup shows a proBNP elevation that suggests the reasons why he has a pleural effusion is cardiac dysfunction more than anything else. As I posted yesterday it is very uncommon for sarcomas to show up with effusions as a site of metastasis.     In any event, the patient is not coughing, no short of breath, no pain. The biopsy site is not bleeding. I removed the bandage and the area is perfectly fine with no  erythema and no leakage.        Past Medical History, Past Surgical History, Social History, Family History have been reviewed and are without significant changes except as mentioned.    Review of Systems   Constitutional:  Positive for activity change and fatigue.   HENT: Negative.     Respiratory: Negative.     Cardiovascular: Negative.    Gastrointestinal: Negative.    Genitourinary:  Positive for frequency.   Musculoskeletal:  Positive for gait problem.   Skin: Negative.    Hematological: Negative.    Psychiatric/Behavioral: Negative.      A comprehensive 14 point review of systems was performed and was negative except as mentioned.    Medications:  The current medication list was reviewed in the EMR    ALLERGIES:  No Known Allergies    Objective      Vitals:    09/16/23 0053 09/16/23 0055 09/16/23 0615 09/16/23 0618   BP: (!) 73/44 100/65 110/65    BP Location: Right arm Right arm Right arm    Patient Position: Sitting  Sitting    Pulse: 80      Resp: 16  18    Temp: 96.8 °F (36 °C)  97 °F (36.1 °C)    TempSrc: Oral  Oral    SpO2: 98%  98%    Weight:    73.9 kg (162 lb 14.7 oz)          No data to display                Physical Exam  Constitutional:       Appearance: He is ill-appearing.   HENT:      Head: Normocephalic.   Eyes:      General: No scleral icterus.  Cardiovascular:      Rate and Rhythm: Normal rate and regular rhythm.      Pulses: Normal pulses.      Heart sounds: Normal heart sounds.   Pulmonary:      Effort: Pulmonary effort is normal.      Breath sounds: Rhonchi present.   Abdominal:      General: Abdomen is flat. There is no distension.      Palpations: Abdomen is soft.      Tenderness: There is no abdominal tenderness. There is no guarding.   Musculoskeletal:      Comments: Mass r thigh unchanged, no drainage from biopsy site   Lymphadenopathy:      Cervical: No cervical adenopathy.   Skin:     General: Skin is warm and dry.      Coloration: Skin is pale.   Neurological:      General: No  focal deficit present.      Mental Status: He is alert.         RECENT LABS:  Hematology WBC   Date Value Ref Range Status   09/16/2023 14.96 (H) 3.40 - 10.80 10*3/mm3 Final     RBC   Date Value Ref Range Status   09/16/2023 3.83 (L) 4.14 - 5.80 10*6/mm3 Final     Hemoglobin   Date Value Ref Range Status   09/16/2023 9.6 (L) 13.0 - 17.7 g/dL Final     Hematocrit   Date Value Ref Range Status   09/16/2023 30.8 (L) 37.5 - 51.0 % Final     Platelets   Date Value Ref Range Status   09/16/2023 284 140 - 450 10*3/mm3 Final        Tissue Pathology Exam (09/14/2023 12:35)     ntains abnormal data Retic With IRF & RET-He  Order: [id number]  Status: Final result       Visible to patient: Yes (seen)       Next appt: 09/21/2023 at 11:00 AM in Radiation Oncology (Brian Manning MD)    Specimen Information: Blood   0 Result Notes      Component  Ref Range & Units 1 d ago   Immature Reticulocyte Fraction  3.0 - 15.8 % 35.9 High    Reticulocyte %  0.70 - 1.90 % 2.06 High    Reticulocyte Absolute  0.0200 - 0.1300 10*6/mm3 0.0810   Reticulocyte Hgb  29.8 - 36.1 pg 25.3 Low    Resulting Agency [facility]              Specimen Collected: 09/15/23 06:39 EDT Last Resulted: 09/15/23 16:45 EDT         Protein, Body Fluid - Body Fluid, Pleural Cavity (09/15/2023 17:39)   Lactate Dehydrogenase, Body Fluid - Body Fluid, Pleural Cavity (09/15/2023 17:39)   C-reactive Protein (09/15/2023 06:39)   BNP (09/16/2023 06:51)     Assessment & Plan    On 09/15/2023 I have a 89-year-old male who has been taking care of his ill wife for many years who has dementia who runs the house, cleans, washes, buys groceries, buys meals, drives the car, pays the bills. 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 Obviously, there is a remote possibility that this could be  representation of a lymphoma, extralymphatic.      The patient on top of that has radiologically speaking bilateral pleural effusions. He has no obvious congestive heart failure and the question is the effusion a manifestation of malignancy or manifestation of some other process. Also on top of that the patient has significant anemia. He has lost weight and his cognitive abilities have declined in the last 3 years. Even though the patient is still able to run the house, he is losing ground a little bit at a time day by day and he really wants to go home to be sure that he fulfills his commitment to his wife in regard to the care of her dementia. The kids are now assisting into all these issues.      Biopsy has been done from the muscle and from the tumor in the right thigh and pathological analysis is pending.      My commentary is typically I would expect in regard to sarcomas of soft tissues to have metastasis in lungs in the form of nodules and not in the form of effusions. That caught my attention in regard if the effusion that he has in both hemithoraces is representation of a different process. I think it is worth it to check a proBNP and check a TSH on this patient.      Also, the patient has lost weight. His nutrition is relatively poor and I think he has anemia. I think it is worth it to do an anemia workup that includes ferritin, iron, TIBC, B12, folic acid, reticulated hemoglobin as well as a serum protein immunoelectrophoresis. I will run a sedimentation rate, an LDH and a C-reactive protein as well.      I discussed with the patient and his daughter present in the room the fact that the management of sarcoma in a 89-year-old individual it is very different than sarcoma management in a patient who is less than 65 and has a good performance status and minimal comorbidities. Typically, the management should be an aggressive plan of neoadjuvant chemotherapy followed by resection followed by further  treatment. Most important of all in my opinion now days is the proper classification of the sarcoma and analysis of Next Generation Sequencing in regard to tissue analysis. Analysis of the chromosomes in the tissue also is crucial. There are centers where these sarcomas are analyzed and defined. Being in training a long time ago in a sarcoma team at [facility], we used to define sarcomas by morphological criteria, now they are defined by molecular and genetic criteria. The abundance of sarcomas, different kinds is now days incredible. Therefore, I think a proper analysis is deserving. I will take care of sending the tissue for analysis for Next Generation Sequencing on this patient to [facility].      We will go ahead and send the analysis of the fluid from the pleural effusion for protein, LDH and cytology. I will run a proBNP and a TSH as posted.      We are awaiting the report from the pathology.      I discussed all these facts with the patient and his daughter. I think the patient could go home tomorrow and I will be glad to see him in the office next week. Resume at the time the conversation about the management of his condition. Again we have a 89-year-old individual who needs to be at home to take care of his wife and that is his desire. I think we have to consider that the management of sarcoma in this kind of patient is very different as I posted from a patient who is younger and has minimal comorbidities.      Obviously, this patient realizes that his health is slowly evolving into the worse and his kids are going to need to assist him to assist also his wife in the care that she requires under the present circumstances. It is a calamity seeing all this happening but is in front of our eyes and we need to assist them in the best way that we can. I also will involve oncology social worker into the management of these situations as well.   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 The lab has requested this tissue to be sent to [facility] for further classification. We will send also part of this tissue for [facility] analysis.     What I see there is not surprising. Again, raises the question in regard what to do about this patient in regard to the local control of this. I doubt very much that the effusion that by the way is a transudate is representation of malignancy. Again, sarcoma shows up in the lungs with metastasis with nodules, not with effusions. The proBNP in this patient is elevated. The fluid content of protein and LDH in the pleural effusion is consistent with a transudate, the cytology is pending. The proBNP as posted is elevated and I do believe that the primary team will work into this process at this time to decide how to proceed about maybe some cardiac assessment in consideration that the patient could be in congestive heart failure.     From the point of view of the anemia, it is very obvious that the patient has a combination of anemia of neoplastic disease and also iron deficiency. The B12 level is normal. The folic acid level is normal. The reticulated hemoglobin is low at 25. Therefore, I do believe that the patient will benefit from a prenatal vitamin for example 3 times a week and I asked the patient's daughter upon discharge to get this going and buy over-the-counter for him. Obviously, the patient will require better nutrition at home and the family will take care of this issue. That way the patient does need to be running around buying food in different windows in different locations all the time.     In regard to the serum protein electrophoresis this will be available next week. I doubt that I will find too much of anything there.     Finally, the most important issue is the social issue and the care that the patient received post discharge. One of the  daughters is going to be more attentive to his care. They already have hired a person to come at least 2 or 3 times a week to help take care of his wife and obviously, this will require a lot of intervention. I am planning for him to be seen by  the day that he comes to the office next week to review the status of disease. I am planning also geriatric oncology assessment at that time.     The patient is not requiring any pain medicine, but Tylenol will be sufficient for the right thigh lesion if necessary. I find no need for narcotic medication at this time.     I will be in charge of the office calling the patient's daughter, Ms. Taryn Hogue, phone # [phone] in regard to appointments to see me next week.     I am going to go ahead and sign off on this patient's care at this time and I will leave the primary team the decision in regard how to proceed about the proBNP and the pleural effusion.     The discussion about all the patient's issues and also the future plan for post discharge care took me 45 minutes with the patient and family.                        9/16/2023      CC:

## 2023-09-16 NOTE — PLAN OF CARE
Goal Outcome Evaluation:  Plan of Care Reviewed With: patient, daughter, son        Progress: no change  Pt is alert and oriented x4, forgetful at times. [place]. Up with stand by assist. No complaints of pain, no prns needed throughout this shift. VSS. Daughter at bedside. Will cont plan of care.

## 2023-09-16 NOTE — PLAN OF CARE
Goal Outcome Evaluation:            No complaints of pain or discomfort this shift.  Pt has slept well.  VSS.  Ambulates to bathroom with standby assist x 1 and walker.  Will continue to monitor.              used

## 2023-09-17 ENCOUNTER — READMISSION MANAGEMENT (OUTPATIENT)
Dept: CALL CENTER | Facility: HOSPITAL | Age: 88
End: 2023-09-17
Payer: MEDICARE

## 2023-09-17 VITALS
RESPIRATION RATE: 16 BRPM | WEIGHT: 163.8 LBS | SYSTOLIC BLOOD PRESSURE: 99 MMHG | TEMPERATURE: 99 F | OXYGEN SATURATION: 100 % | BODY MASS INDEX: 25.26 KG/M2 | HEART RATE: 95 BPM | DIASTOLIC BLOOD PRESSURE: 63 MMHG

## 2023-09-17 PROBLEM — J90 PLEURAL EFFUSION, RIGHT: Status: RESOLVED | Noted: 2023-09-14 | Resolved: 2023-09-17

## 2023-09-17 LAB
ANION GAP SERPL CALCULATED.3IONS-SCNC: 10 MMOL/L (ref 5–15)
BUN SERPL-MCNC: 21 MG/DL (ref 8–23)
BUN/CREAT SERPL: 19.8 (ref 7–25)
CALCIUM SPEC-SCNC: 8.2 MG/DL (ref 8.2–9.6)
CHLORIDE SERPL-SCNC: 104 MMOL/L (ref 98–107)
CO2 SERPL-SCNC: 21 MMOL/L (ref 22–29)
CREAT SERPL-MCNC: 1.06 MG/DL (ref 0.76–1.27)
DEPRECATED RDW RBC AUTO: 44.5 FL (ref 37–54)
EGFRCR SERPLBLD CKD-EPI 2021: 65.4 ML/MIN/1.73
ERYTHROCYTE [DISTWIDTH] IN BLOOD BY AUTOMATED COUNT: 15.4 % (ref 12.3–15.4)
GLUCOSE SERPL-MCNC: 96 MG/DL (ref 65–99)
HCT VFR BLD AUTO: 30.1 % (ref 37.5–51)
HGB BLD-MCNC: 9.4 G/DL (ref 13–17.7)
MCH RBC QN AUTO: 25.1 PG (ref 26.6–33)
MCHC RBC AUTO-ENTMCNC: 31.2 G/DL (ref 31.5–35.7)
MCV RBC AUTO: 80.3 FL (ref 79–97)
PLATELET # BLD AUTO: 293 10*3/MM3 (ref 140–450)
PMV BLD AUTO: 11.1 FL (ref 6–12)
POTASSIUM SERPL-SCNC: 3.9 MMOL/L (ref 3.5–5.2)
RBC # BLD AUTO: 3.75 10*6/MM3 (ref 4.14–5.8)
SODIUM SERPL-SCNC: 135 MMOL/L (ref 136–145)
WBC NRBC COR # BLD: 17.32 10*3/MM3 (ref 3.4–10.8)

## 2023-09-17 PROCEDURE — 85027 COMPLETE CBC AUTOMATED: CPT | Performed by: STUDENT IN AN ORGANIZED HEALTH CARE EDUCATION/TRAINING PROGRAM

## 2023-09-17 PROCEDURE — 80048 BASIC METABOLIC PNL TOTAL CA: CPT | Performed by: STUDENT IN AN ORGANIZED HEALTH CARE EDUCATION/TRAINING PROGRAM

## 2023-09-17 RX ORDER — TRAZODONE HYDROCHLORIDE 50 MG/1
25 TABLET ORAL NIGHTLY PRN
Qty: 30 TABLET | Refills: 0 | Status: SHIPPED | OUTPATIENT
Start: 2023-09-17

## 2023-09-17 RX ADMIN — PANTOPRAZOLE SODIUM 40 MG: 40 TABLET, DELAYED RELEASE ORAL at 06:25

## 2023-09-17 RX ADMIN — ROSUVASTATIN CALCIUM 10 MG: 10 TABLET, FILM COATED ORAL at 09:36

## 2023-09-17 NOTE — NURSING NOTE
PT has no complaints of pain or discomfort. AVS reviewed with PT and his daughter. Med-pickup location updated. No inquiries made. PT escorts via wheelchair by transport to home.

## 2023-09-17 NOTE — PLAN OF CARE
Goal Outcome Evaluation:      Pt pleasant and cooperative.   Up to bathroom with stand by assist and walker. VSS.  No complaints of pain this shift.  Pt appears to be resting comfortably at this time.  Will continue to monitor.

## 2023-09-17 NOTE — DISCHARGE SUMMARY
Patient Name: Enrique Gillette  : 1930  MRN: [id number]    Date of Admission: 2023  Date of Discharge:  2023  Primary Care Physician: Nazanin Lucio MD      Chief Complaint:   Leg Swelling      Discharge Diagnoses     Active Hospital Problems    Diagnosis  POA    **Mass of right lower extremity [R22.41]  Yes    HLD (hyperlipidemia) [E78.5]  Yes    Chronic back pain [M54.9, G89.29]  Yes    Anemia [D64.9]  Yes      Resolved Hospital Problems    Diagnosis Date Resolved POA    Pleural effusion, right [J90] 2023 Yes        Hospital Course     Mr. Gillette is a 89 y.o. male with a history of spinal stenosis/chronic back pain, hyperlipidemia, BPH, GERD, chronic right thigh mass under observation who presented to [facility] initially complaining of increasing size of the right thigh mass and lower extremity swelling.  Please see the admitting history and physical for further details.  He was found to have a tumor in his right thigh as well as a right pleural effusion and was admitted to the hospital for further evaluation and treatment.      A duplex was negative.  He underwent biopsy of the mass on 2023.  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  He was seen in consultation by orthopedic surgery, oncology, and radiation oncology.  They will recommend outpatient follow-up once pathology is available.    With regard to the effusion, he underwent ultrasound-guided thoracentesis on 9/15/2023 with 600 cc of transudative fluid removed.  Cytology is pending at discharge.  BNP was elevated, but he had no other evidence of heart failure on physical exam or imaging, so we did not pursue an echocardiogram this admission, but if he has recurrence of his effusion, or heart failure symptoms, then I think this would be a good idea to pursue as an outpatient.      I ended up keeping the patient in the hospital for an additional couple of days because he developed a fever and  leukocytosis.  He had absolutely no infectious symptoms.  Urinalysis, chest x-ray were both negative.  The fever is resolved, but the leukocytosis persists.  Blood cultures are negative at 24 hours, and he is very anxious for discharge.  Since he has no symptoms, and he has remained afebrile for greater than 24 hours, I will discharge him home today.  If his blood culture returns positive, then I have instructed him to be available by phone as I may have to call him back to the hospital.  Return instructions have been provided.  Discussed with Dr. Gusman, and he thinks that the leukocytosis this is probably secondary to his tumor.    Day of Discharge     Subjective:  No events overnight.  Leukocytosis a bit worse to 17,000, but no further fevers.  Vital signs are normal.  Absolutely no infectious symptoms.  He is very anxious for discharge.    Physical Exam:  Temp:  [98.2 °F (36.8 °C)-99.7 °F (37.6 °C)] 99 °F (37.2 °C)  Heart Rate:  [85-99] 95  Resp:  [16-18] 16  BP: ()/(63-81) 99/63  Body mass index is 25.26 kg/m².  Physical Exam  Constitutional:       General: He is not in acute distress.     Appearance: He is not toxic-appearing.   Cardiovascular:      Rate and Rhythm: Normal rate and regular rhythm.      Heart sounds: Normal heart sounds.   Pulmonary:      Effort: Pulmonary effort is normal.      Breath sounds: Normal breath sounds.   Abdominal:      General: Bowel sounds are normal.      Palpations: Abdomen is soft.   Musculoskeletal:      Right lower leg: No edema.      Left lower leg: No edema.      Comments: Right medial thigh tumor   Neurological:      Mental Status: He is alert.   Psychiatric:         Mood and Affect: Mood normal.         Behavior: Behavior normal.       Consultants     Consult Orders (all) (From admission, onward)       Start     Ordered    09/15/23 0801  Hematology & Oncology Inpatient Consult  Once        Specialty:  Hematology and Oncology  Provider:  Stanislav Gusman MD     09/15/23 0801    09/15/23 0801  Inpatient Radiation Oncology Consult  Once        Specialty:  Radiation Oncology  Provider:  Victor Manuel Reese MD    09/15/23 0801    09/12/23 1333  Inpatient Orthopedic Surgery Consult  Once        Specialty:  Orthopedic Surgery  Provider:  Alvaro Mai II, MD    09/12/23 1334    09/12/23 1220  [facility] (on-call MD unless specified) Details  Once        Specialty:  Hospitalist  Provider:  (Not yet assigned)    09/12/23 1219                  Procedures     Imaging Results (All)       Procedure Component Value Units Date/Time    XR Chest 1 View [[id number]] Collected: 09/16/23 0702     Updated: 09/16/23 0748    Narrative:      ONE-VIEW PORTABLE CHEST     HISTORY: Follow-up evaluation after right thoracentesis.     FINDINGS: The lungs are clear except for some minimal vague atelectasis  at the left base and the moderate right pleural effusion noted on the CT  scan performed 2 days ago is no longer visible. There is no pneumothorax  following thoracentesis. The heart remains enlarged.     This report was finalized on 9/16/2023 7:45 AM by Dr. Raman Faulkner M.D.       US Thoracentesis [[id number]] Collected: 09/15/23 1201    Specimen: Body Fluid Updated: 09/15/23 1204    Narrative:      ULTRASOUND-GUIDED RIGHT THORACENTESIS. 9/15/2023     HISTORY: Pleural effusion.     After signed informed consent was obtained the patient was prepped and  draped in the upright sitting position. Lidocaine was used for local  anesthesia.     Ultrasound guidance was used to place the thoracentesis catheter into  the right pleural fluid collection. 600 cc was removed.     Confirmatory images were obtained.     Patient tolerated the procedure well with no complications.       Impression:      Ultrasound-guided right thoracentesis as described.           This report was finalized on 9/15/2023 12:01 PM by Dr. Deven Zhang M.D.       US Guided Tissue Biopsy [[id number]] Collected: 09/14/23 1454      Updated: 09/14/23 1500    Narrative:      US GUIDED TISSUE ASPIRATION AND BIOPSY OF RIGHT ANTERIOR THIGH MASS  09/14/2023     HISTORY: Right anterior thigh mass.     After signed informed consent was obtained the anterior right thigh was  prepped and draped in the usual sterile fashion. Lidocaine was used for  local anesthesia.     18-gauge needle was introduced into the hypoechoic collection of the  anterior right thigh. Approximately 110 mL of nonpurulent  serosanguineous fluid was aspirated. This was followed by core biopsies  of a more solid-appearing component of the mass using an 18-gauge biopsy  device.     Confirmatory images were obtained.     Patient tolerated the procedure well with no complications. Pathology  report is pending.     This report was finalized on 9/14/2023 2:57 PM by Dr. Deven Zhang M.D.       CT Chest With Contrast Diagnostic [[id number]] Collected: 09/14/23 1445     Updated: 09/14/23 1453    Narrative:      Examination: CT of the chest, abdomen, and pelvis with contrast     TECHNIQUE: CT of the chest, abdomen, and pelvis after the uneventful  administration of nonionic intravenous contrast per protocol. Radiation  dose reduction techniques were utilized, including automated exposure  control and exposure modulation based on body size.     HISTORY: Metastatic disease evaluation. Musculoskeletal neoplasm in the  right thigh     COMPARISON:None available     FINDINGS: Chest:     There are moderate sized right and small left pleural effusions.  Dependent atelectasis is seen, without consolidation or pneumothorax.  There is old granulomatous disease. The central airways are patent.     No enlarged supraclavicular, axillary, mediastinal, or hilar lymph nodes  are seen. The mediastinal vasculature is normal in caliber. There are  coronary calcifications. The heart is mildly enlarged, without  pericardial effusion.     Abdomen and pelvis:     Old granulomatous disease is seen in the liver  and spleen.  Low-attenuation right renal lesions are technically indeterminate,  likely cysts. The left kidney is absent. There is a left-sided urinary  bladder diverticulum. A pancreatic calcification is compatible with  chronic pancreatitis.     The liver, gallbladder, spleen, adrenal glands,, stomach, small bowel,  appendix, large bowel, and abdominal vasculature are normal. No  intraperitoneal fluid collection or free gas are seen. No enlarged lymph  nodes are demonstrated.     Bone windows demonstrate degenerative changes, without suspicious  osseous lesion seen.     There is an incompletely evaluated right thigh mass, measuring 8 x 5.3  cm on series 2, image 105.       Impression:      1. Incompletely evaluated right thigh mass     2. Moderate size right and small left pleural effusions     3. Incidental findings as above, without convincing evidence of  metastatic disease in the chest, abdomen, or pelvis.        This report was finalized on 9/14/2023 2:50 PM by Dr. Tony Foley M.D.       CT Abdomen Pelvis With Contrast [[id number]] Collected: 09/14/23 1445     Updated: 09/14/23 1453    Narrative:      Examination: CT of the chest, abdomen, and pelvis with contrast     TECHNIQUE: CT of the chest, abdomen, and pelvis after the uneventful  administration of nonionic intravenous contrast per protocol. Radiation  dose reduction techniques were utilized, including automated exposure  control and exposure modulation based on body size.     HISTORY: Metastatic disease evaluation. Musculoskeletal neoplasm in the  right thigh     COMPARISON:None available     FINDINGS: Chest:     There are moderate sized right and small left pleural effusions.  Dependent atelectasis is seen, without consolidation or pneumothorax.  There is old granulomatous disease. The central airways are patent.     No enlarged supraclavicular, axillary, mediastinal, or hilar lymph nodes  are seen. The mediastinal vasculature is normal in caliber.  There are  coronary calcifications. The heart is mildly enlarged, without  pericardial effusion.     Abdomen and pelvis:     Old granulomatous disease is seen in the liver and spleen.  Low-attenuation right renal lesions are technically indeterminate,  likely cysts. The left kidney is absent. There is a left-sided urinary  bladder diverticulum. A pancreatic calcification is compatible with  chronic pancreatitis.     The liver, gallbladder, spleen, adrenal glands,, stomach, small bowel,  appendix, large bowel, and abdominal vasculature are normal. No  intraperitoneal fluid collection or free gas are seen. No enlarged lymph  nodes are demonstrated.     Bone windows demonstrate degenerative changes, without suspicious  osseous lesion seen.     There is an incompletely evaluated right thigh mass, measuring 8 x 5.3  cm on series 2, image 105.       Impression:      1. Incompletely evaluated right thigh mass     2. Moderate size right and small left pleural effusions     3. Incidental findings as above, without convincing evidence of  metastatic disease in the chest, abdomen, or pelvis.        This report was finalized on 9/14/2023 2:50 PM by Dr. Tony Foley M.D.       CT Lower Extremity Right With Contrast [[id number]] Collected: 09/12/23 1828     Updated: 09/12/23 1855    Narrative:      RIGHT THIGH CT WITH CONTRAST     HISTORY: Follow-up enlarging soft tissue mass in the thigh. MRI in March 2023 demonstrated an 8 x 4 x 10 cm long intramuscular right thigh lesion  with hemorrhagic components. On the MRI, differential considerations of  hematoma with adjacent inflammatory response versus solid mass lesion  with hemorrhage were provided.     TECHNIQUE: Right thigh CT with 85 mL of Isovue-300 IV contrast is  provided and correlated with the MRI from 03/21/2023. Radiation dose  reduction techniques were utilized, including automated exposure control  and exposure modulation based on body size.        FINDINGS: The fluid  component of the right thigh lesion has  substantially increased in size in the interval since the prior exam.  The lesion measures 19 cm long and 8 x 11 cm axial and is positioned  within the vastus lateralis muscle. Most of the volume of the lesion  comprises fluid signal. There is a rind of intermediate density,  possibly enhancing material around the periphery of the lesion measuring  up to about 12 mm thick. This is most pronounced along the proximal and  anterior portions of the lesion.     No lymphadenopathy. Anterior thigh musculature is otherwise preserved.  There is fatty atrophic change of the semimembranosus muscle and  partially along the biceps femoris. No joint effusion at the knee or the  hip. No bone lesion. Arthritis at the hip.       Impression:      Compared with MRI 03/21/2023, there has been significant  enlargement in the intramuscular anterolateral right thigh lesion in the  vastus lateralis muscle with dimensions as above. The enlargement  appears to be primarily due to increased volume of fluid within the  lesion. An irregular rind of thickened tissue around the periphery of  the lesion is again observed and as stated previously, differential of  chronic hematoma versus hemorrhagic solid/cystic tumor remains.     This report was finalized on 9/12/2023 6:52 PM by Dr. Victor Manuel Son M.D.               Pertinent Labs     Results from last 7 days   Lab Units 09/17/23  0640 09/16/23  0651 09/15/23  0639 09/14/23  0700   WBC 10*3/mm3 17.32* 14.96* 14.96* 11.34*   HEMOGLOBIN g/dL 9.4* 9.6* 9.9* 9.2*   PLATELETS 10*3/mm3 293 284 301 297     Results from last 7 days   Lab Units 09/17/23  0640 09/16/23  0651 09/15/23  0639 09/14/23  0700   SODIUM mmol/L 135* 140 138 140   POTASSIUM mmol/L 3.9 3.8 4.4 4.1   CHLORIDE mmol/L 104 108* 106 109*   CO2 mmol/L 21.0* 22.5 22.9 23.1   BUN mg/dL 21 20 17 18   CREATININE mg/dL 1.06 0.90 1.04 0.88   GLUCOSE mg/dL 96 102* 100* 91   Estimated Creatinine  Clearance: 45.8 mL/min (by C-G formula based on SCr of 1.06 mg/dL).  Results from last 7 days   Lab Units 09/12/23  1021   ALBUMIN g/dL 2.9*   BILIRUBIN mg/dL 0.5   ALK PHOS U/L 153*   AST (SGOT) U/L 27   ALT (SGPT) U/L 15     Results from last 7 days   Lab Units 09/17/23  0640 09/16/23  0651 09/15/23  0639 09/14/23  0700 09/13/23  0714 09/12/23  1021   CALCIUM mg/dL 8.2 8.5 8.3 8.6 8.2 8.9   ALBUMIN g/dL  --   --   --   --   --  2.9*   MAGNESIUM mg/dL  --   --   --   --  2.0  --    PHOSPHORUS mg/dL  --   --   --   --  3.1  --        Results from last 7 days   Lab Units 09/16/23  0651   PROBNP pg/mL 2,847.0*           Invalid input(s): LDLCALC  Results from last 7 days   Lab Units 09/16/23  1112 09/16/23  0923 09/15/23  0930   BLOODCX  No growth at 24 hours No growth at 24 hours  --    BODYFLDCX   --   --  No growth at 2 days       Test Results Pending at Discharge     Pending Labs       Order Current Status    Anaerobic Culture - Pleural Fluid, Pleural Cavity In process    Flow Cytometry, Path Only In process    Immunofixation, Serum In process    Non-gynecologic Cytology In process    Non-gynecologic Cytology In process    Blood Culture - Blood, Hand, Left Preliminary result    Blood Culture - Blood, Hand, Right Preliminary result    Body Fluid Culture - Body Fluid, Pleural Cavity Preliminary result    Tissue Pathology Exam Preliminary result            Discharge Details        Discharge Medications        New Medications        Instructions Start Date   pantoprazole 40 MG EC tablet  Commonly known as: PROTONIX   40 mg, Oral, Every Early Morning      traZODone 50 MG tablet  Commonly known as: DESYREL   25 mg, Oral, Nightly PRN             Continue These Medications        Instructions Start Date   acetaminophen 500 MG tablet  Commonly known as: TYLENOL   500 mg, Oral, Every 6 Hours PRN      NON FORMULARY   Omega 3 joint pain       rosuvastatin 10 MG tablet  Commonly known as: CRESTOR   TAKE 1 TABLET DAILY                No Known Allergies      Discharge Disposition:  Home or Self Care    Discharge Diet:  Diet Order   Procedures    Diet: Regular/House Diet; Texture: Regular Texture (IDDSI 7); Fluid Consistency: Thin (IDDSI 0)       Discharge Activity:   Activity Instructions       Activity as Tolerated     Additional Activity Instructions:    Use walker at home           CODE STATUS:    Code Status and Medical Interventions:   Ordered at: 09/12/23 1336     Medical Intervention Limits:    NO intubation (DNI)     Code Status (Patient has no pulse and is not breathing):    CPR (Attempt to Resuscitate)     Medical Interventions (Patient has pulse or is breathing):    Limited Support       Future Appointments   Date Time Provider Department Center   9/21/2023 11:00 AM Brian Manning MD [facility] None   1/8/2024 10:45 AM Nazanin Lucio MD [facility]     Additional Instructions for the Follow-ups that You Need to Schedule       Call MD With Problems / Concerns   As directed      Instructions: return to the hospital if you experience chest pain, shortness of breath, abdominal pain, nausea, vomiting, fevers, sweats, chills, or worsening of your symptoms    Order Comments: Instructions: return to the hospital if you experience chest pain, shortness of breath, abdominal pain, nausea, vomiting, fevers, sweats, chills, or worsening of your symptoms         Discharge Follow-up with PCP   As directed       Currently Documented PCP:    Nazanin Lucio MD    PCP Phone Number:    [phone]     Follow Up Details: 2 weeks        Discharge Follow-up with Specialty: oncology, as directed   As directed      Specialty: oncology, as directed        Discharge Follow-up with Specified Provider: orthopedic surgery, as directed   As directed      To: orthopedic surgery, as directed        Discharge Follow-up with Specified Provider: radiation oncology, as directed   As directed      To: radiation oncology, as directed                Follow-up Information       Nazanin Lucio MD Follow up in 1 week(s).    Specialties: Family Medicine, Internal Medicine  Contact information:  [address] [place] [address]  [phone]               Mark Vargas MD Follow up on 9/25/2023.    Specialty: Orthopedic Surgery  Contact information:  [address] [address]  [phone]               Nazanin Lucio MD .    Specialties: Family Medicine, Internal Medicine  Why: 2 weeks  Contact information:  [address] [place] [address]  [phone]                             Additional Instructions for the Follow-ups that You Need to Schedule       Call MD With Problems / Concerns   As directed      Instructions: return to the hospital if you experience chest pain, shortness of breath, abdominal pain, nausea, vomiting, fevers, sweats, chills, or worsening of your symptoms    Order Comments: Instructions: return to the hospital if you experience chest pain, shortness of breath, abdominal pain, nausea, vomiting, fevers, sweats, chills, or worsening of your symptoms         Discharge Follow-up with PCP   As directed       Currently Documented PCP:    Nazanin Lucio MD    PCP Phone Number:    [phone]     Follow Up Details: 2 weeks        Discharge Follow-up with Specialty: oncology, as directed   As directed      Specialty: oncology, as directed        Discharge Follow-up with Specified Provider: orthopedic surgery, as directed   As directed      To: orthopedic surgery, as directed        Discharge Follow-up with Specified Provider: radiation oncology, as directed   As directed      To: radiation oncology, as directed            Time Spent on Discharge:  Greater than 30 minutes      Tyrell Cueto MD  [facility]  09/17/23  11:55 EDT

## 2023-09-17 NOTE — OUTREACH NOTE
Prep Survey      Flowsheet Row Responses   [facility] patient discharged from? [place]   Is LACE score < 7 ? No   Eligibility [facility]   Date of Admission 09/12/23   Date of Discharge 09/17/23   Discharge Disposition Home or Self Care   Discharge diagnosis *Mass of right lower extremity (   Does the patient have one of the following disease processes/diagnoses(primary or secondary)? Other   Does the patient have Home health ordered? No   Is there a DME ordered? No   Prep survey completed? Yes            MIHAELA TEJEDA - Registered Nurse

## 2023-09-18 ENCOUNTER — TRANSITIONAL CARE MANAGEMENT TELEPHONE ENCOUNTER (OUTPATIENT)
Dept: CALL CENTER | Facility: HOSPITAL | Age: 88
End: 2023-09-18
Payer: MEDICARE

## 2023-09-18 DIAGNOSIS — R22.41 MASS OF RIGHT LOWER EXTREMITY: Primary | ICD-10-CM

## 2023-09-18 LAB
IGA SERPL-MCNC: 359 MG/DL (ref 61–437)
IGG SERPL-MCNC: 1072 MG/DL (ref 603–1613)
IGM SERPL-MCNC: 99 MG/DL (ref 15–143)
LAB AP CASE REPORT: NORMAL
PATH REPORT.FINAL DX SPEC: NORMAL
PATH REPORT.GROSS SPEC: NORMAL
PROT PATTERN SERPL IFE-IMP: NORMAL

## 2023-09-18 NOTE — OUTREACH NOTE
Call Center TCM Note      Flowsheet Row Responses   [facility] patient discharged from? [place]   Does the patient have one of the following disease processes/diagnoses(primary or secondary)? Other   TCM attempt successful? Yes   Call start time 1251   Call end time 1256   Discharge diagnosis *Mass of right lower extremity   Is patient permission given to speak with other caregiver? Yes   Person spoke with today (if not patient) and relationship dtr Taryn, pt present   Meds reviewed with patient/caregiver? Yes   Is the patient having any side effects they believe may be caused by any medication additions or changes? No   Does the patient have all medications ordered at discharge? Yes   Is the patient taking all medications as directed (includes completed medication regime)? Yes   Comments TCM APPT WITH PCP DR SANDERS IS 09/26/2023   Does the patient have an appointment with their PCP within 7-14 days of discharge? Yes   Has home health visited the patient within 72 hours of discharge? N/A   Psychosocial issues? No   Did the patient receive a copy of their discharge instructions? Yes   Nursing interventions Reviewed instructions with patient   What is the patient's perception of their health status since discharge? Improving   Is the patient/caregiver able to teach back signs and symptoms related to disease process for when to call PCP? Yes   Is the patient/caregiver able to teach back signs and symptoms related to disease process for when to call 911? Yes   Is the patient/caregiver able to teach back the hierarchy of who to call/visit for symptoms/problems? PCP, Specialist, Home health nurse, Urgent Care, ED, 911 Yes   If the patient is a current smoker, are they able to teach back resources for cessation? Not a smoker   TCM call completed? Yes   Wrap up additional comments D/C DX: Mass of right lower extremity - Spoke with pt and corona [place]. Excellent family support from children. Pt doing pretty well  today. New rx's Protonix, Trazodone in place. No questions at this time. Pt has several appts this week, TCM APPT with PCP Dr Lucio has been sched for 09/26/2023.   Call end time [id number]            Nadia Solano MA    9/18/2023, 12:58 EDT

## 2023-09-19 LAB
CYTO UR: NORMAL
CYTO UR: NORMAL
LAB AP CASE REPORT: NORMAL
LAB AP CASE REPORT: NORMAL
LAB AP DIAGNOSIS COMMENT: NORMAL
PATH REPORT.FINAL DX SPEC: NORMAL
PATH REPORT.FINAL DX SPEC: NORMAL
PATH REPORT.GROSS SPEC: NORMAL
PATH REPORT.GROSS SPEC: NORMAL

## 2023-09-20 ENCOUNTER — TELEPHONE (OUTPATIENT)
Dept: RADIATION ONCOLOGY | Facility: HOSPITAL | Age: 88
End: 2023-09-20
Payer: MEDICARE

## 2023-09-20 LAB
BACTERIA FLD CULT: NORMAL
BACTERIA SPEC ANAEROBE CULT: NORMAL
GRAM STN SPEC: NORMAL
GRAM STN SPEC: NORMAL

## 2023-09-20 RX ORDER — ROSUVASTATIN CALCIUM 10 MG/1
10 TABLET, COATED ORAL DAILY
Qty: 90 TABLET | Refills: 3 | Status: SHIPPED | OUTPATIENT
Start: 2023-09-20 | End: 2023-09-21

## 2023-09-21 ENCOUNTER — DOCUMENTATION (OUTPATIENT)
Dept: OTHER | Facility: HOSPITAL | Age: 88
End: 2023-09-21
Payer: MEDICARE

## 2023-09-21 ENCOUNTER — OFFICE VISIT (OUTPATIENT)
Dept: RADIATION ONCOLOGY | Facility: HOSPITAL | Age: 88
End: 2023-09-21
Payer: MEDICARE

## 2023-09-21 ENCOUNTER — HOSPITAL ENCOUNTER (OUTPATIENT)
Dept: RADIATION ONCOLOGY | Facility: HOSPITAL | Age: 88
Setting detail: RADIATION/ONCOLOGY SERIES
End: 2023-09-21
Payer: MEDICARE

## 2023-09-21 ENCOUNTER — LAB (OUTPATIENT)
Dept: LAB | Facility: HOSPITAL | Age: 88
End: 2023-09-21

## 2023-09-21 ENCOUNTER — OFFICE VISIT (OUTPATIENT)
Dept: ONCOLOGY | Facility: CLINIC | Age: 88
End: 2023-09-21
Payer: MEDICARE

## 2023-09-21 VITALS
WEIGHT: 169.4 LBS | SYSTOLIC BLOOD PRESSURE: 105 MMHG | OXYGEN SATURATION: 98 % | BODY MASS INDEX: 26.12 KG/M2 | DIASTOLIC BLOOD PRESSURE: 60 MMHG | HEART RATE: 103 BPM

## 2023-09-21 VITALS
RESPIRATION RATE: 16 BRPM | TEMPERATURE: 97.3 F | WEIGHT: 170 LBS | SYSTOLIC BLOOD PRESSURE: 112 MMHG | HEART RATE: 94 BPM | DIASTOLIC BLOOD PRESSURE: 65 MMHG | BODY MASS INDEX: 25.76 KG/M2 | HEIGHT: 68 IN | OXYGEN SATURATION: 95 %

## 2023-09-21 DIAGNOSIS — R22.41 MASS OF RIGHT LOWER EXTREMITY: ICD-10-CM

## 2023-09-21 DIAGNOSIS — D72.823 LEUKEMOID REACTION: ICD-10-CM

## 2023-09-21 DIAGNOSIS — I48.20 CHRONIC ATRIAL FIBRILLATION: ICD-10-CM

## 2023-09-21 DIAGNOSIS — D63.0 ANEMIA IN NEOPLASTIC DISEASE: ICD-10-CM

## 2023-09-21 DIAGNOSIS — Z51.5 TERMINAL CARE: ICD-10-CM

## 2023-09-21 DIAGNOSIS — G89.3 CANCER RELATED PAIN: ICD-10-CM

## 2023-09-21 DIAGNOSIS — C49.21 SARCOMA OF LOWER EXTREMITY, RIGHT: ICD-10-CM

## 2023-09-21 DIAGNOSIS — R53.0 NEOPLASTIC (MALIGNANT) RELATED FATIGUE: ICD-10-CM

## 2023-09-21 DIAGNOSIS — D72.829 LEUKOCYTOSIS, UNSPECIFIED TYPE: Primary | ICD-10-CM

## 2023-09-21 DIAGNOSIS — C76.51: Primary | ICD-10-CM

## 2023-09-21 DIAGNOSIS — I50.21 ACUTE SYSTOLIC CONGESTIVE HEART FAILURE: ICD-10-CM

## 2023-09-21 LAB
ALBUMIN SERPL-MCNC: 2.7 G/DL (ref 3.5–5.2)
ALBUMIN/GLOB SERPL: 0.7 G/DL
ALP SERPL-CCNC: 355 U/L (ref 39–117)
ALT SERPL W P-5'-P-CCNC: 45 U/L (ref 1–41)
ANION GAP SERPL CALCULATED.3IONS-SCNC: 15.2 MMOL/L (ref 5–15)
AST SERPL-CCNC: 68 U/L (ref 1–40)
BACTERIA SPEC AEROBE CULT: NORMAL
BACTERIA SPEC AEROBE CULT: NORMAL
BASOPHILS # BLD AUTO: 0.04 10*3/MM3 (ref 0–0.2)
BASOPHILS NFR BLD AUTO: 0.2 % (ref 0–1.5)
BILIRUB SERPL-MCNC: 1.2 MG/DL (ref 0–1.2)
BUN SERPL-MCNC: 24 MG/DL (ref 8–23)
BUN/CREAT SERPL: 21.4 (ref 7–25)
CALCIUM SPEC-SCNC: 8.5 MG/DL (ref 8.2–9.6)
CHLORIDE SERPL-SCNC: 101 MMOL/L (ref 98–107)
CO2 SERPL-SCNC: 19.8 MMOL/L (ref 22–29)
CREAT SERPL-MCNC: 1.12 MG/DL (ref 0.7–1.3)
DEPRECATED RDW RBC AUTO: 51 FL (ref 37–54)
EGFRCR SERPLBLD CKD-EPI 2021: 61.3 ML/MIN/1.73
EOSINOPHIL # BLD AUTO: 0.33 10*3/MM3 (ref 0–0.4)
EOSINOPHIL NFR BLD AUTO: 1.6 % (ref 0.3–6.2)
ERYTHROCYTE [DISTWIDTH] IN BLOOD BY AUTOMATED COUNT: 16.7 % (ref 12.3–15.4)
GLOBULIN UR ELPH-MCNC: 3.7 GM/DL
GLUCOSE SERPL-MCNC: 124 MG/DL (ref 65–99)
HCT VFR BLD AUTO: 32.9 % (ref 37.5–51)
HGB BLD-MCNC: 9.9 G/DL (ref 13–17.7)
IMM GRANULOCYTES # BLD AUTO: 0.14 10*3/MM3 (ref 0–0.05)
IMM GRANULOCYTES NFR BLD AUTO: 0.7 % (ref 0–0.5)
LYMPHOCYTES # BLD AUTO: 0.89 10*3/MM3 (ref 0.7–3.1)
LYMPHOCYTES NFR BLD AUTO: 4.2 % (ref 19.6–45.3)
MCH RBC QN AUTO: 25.1 PG (ref 26.6–33)
MCHC RBC AUTO-ENTMCNC: 30.1 G/DL (ref 31.5–35.7)
MCV RBC AUTO: 83.3 FL (ref 79–97)
MONOCYTES # BLD AUTO: 1.62 10*3/MM3 (ref 0.1–0.9)
MONOCYTES NFR BLD AUTO: 7.6 % (ref 5–12)
NEUTROPHILS NFR BLD AUTO: 18.18 10*3/MM3 (ref 1.7–7)
NEUTROPHILS NFR BLD AUTO: 85.7 % (ref 42.7–76)
NRBC BLD AUTO-RTO: 0 /100 WBC (ref 0–0.2)
PLATELET # BLD AUTO: 354 10*3/MM3 (ref 140–450)
PMV BLD AUTO: 10.7 FL (ref 6–12)
POTASSIUM SERPL-SCNC: 4.4 MMOL/L (ref 3.5–5.2)
PROT SERPL-MCNC: 6.4 G/DL (ref 6–8.5)
RBC # BLD AUTO: 3.95 10*6/MM3 (ref 4.14–5.8)
SODIUM SERPL-SCNC: 136 MMOL/L (ref 136–145)
WBC NRBC COR # BLD: 21.2 10*3/MM3 (ref 3.4–10.8)

## 2023-09-21 PROCEDURE — 36415 COLL VENOUS BLD VENIPUNCTURE: CPT

## 2023-09-21 PROCEDURE — G0463 HOSPITAL OUTPT CLINIC VISIT: HCPCS | Performed by: INTERNAL MEDICINE

## 2023-09-21 PROCEDURE — 85025 COMPLETE CBC W/AUTO DIFF WBC: CPT

## 2023-09-21 PROCEDURE — 80053 COMPREHEN METABOLIC PANEL: CPT

## 2023-09-21 RX ORDER — PREDNISONE 10 MG/1
10 TABLET ORAL DAILY
Qty: 30 TABLET | Refills: 1 | Status: SHIPPED | OUTPATIENT
Start: 2023-09-21

## 2023-09-21 RX ORDER — HYDROCODONE BITARTRATE AND ACETAMINOPHEN 5; 325 MG/1; MG/1
1 TABLET ORAL EVERY 8 HOURS PRN
Qty: 60 TABLET | Refills: 0 | Status: SHIPPED | OUTPATIENT
Start: 2023-09-21

## 2023-09-21 RX ORDER — TAMSULOSIN HYDROCHLORIDE 0.4 MG/1
CAPSULE ORAL
COMMUNITY
Start: 2023-08-30

## 2023-09-21 RX ORDER — ONDANSETRON 8 MG/1
8 TABLET, ORALLY DISINTEGRATING ORAL EVERY 8 HOURS PRN
Qty: 60 TABLET | Refills: 1 | Status: SHIPPED | OUTPATIENT
Start: 2023-09-21

## 2023-09-21 RX ORDER — POLYETHYLENE GLYCOL 3350 17 G/17G
17 POWDER, FOR SOLUTION ORAL DAILY
Qty: 20 PACKET | Refills: 3 | Status: SHIPPED | OUTPATIENT
Start: 2023-09-21

## 2023-09-21 RX ORDER — FUROSEMIDE 20 MG/1
10 TABLET ORAL DAILY
Qty: 30 TABLET | Refills: 1 | Status: SHIPPED | OUTPATIENT
Start: 2023-09-21

## 2023-09-21 NOTE — PROGRESS NOTES
[facility] Radiation Oncology   Follow Up    Chief Complaint  Right thigh extraskeletal osteosarcoma      Diagnosis:    Diagnosis Plan   1. Primary malignant neoplasm of right thigh                  Interval History:    Enrique Gillette is a 89 y.o. male with a right thigh mass that has been growing. Biopsy on 9/14/2023 showed an extraskeletal osteosarcoma with outside pathology review sent to [facility].     I initially saw him during his recent hospitalization. The patient was discharged on 9/17/2023.     The patient returns to discuss treatment options.    Today he reports he has pain. His pain is rated at a 4/10 and can vary day to day. He and his family have significant concerns about aggressive treatment. They are interested in their treatment options.     Imaging:      CT Chest With Contrast Diagnostic    Result Date: 9/14/2023  1. Incompletely evaluated right thigh mass  2. Moderate size right and small left pleural effusions  3. Incidental findings as above, without convincing evidence of metastatic disease in the chest, abdomen, or pelvis.   This report was finalized on 9/14/2023 2:50 PM by Dr. Tony Foley M.D.      CT Abdomen Pelvis With Contrast    Result Date: 9/14/2023  1. Incompletely evaluated right thigh mass  2. Moderate size right and small left pleural effusions  3. Incidental findings as above, without convincing evidence of metastatic disease in the chest, abdomen, or pelvis.   This report was finalized on 9/14/2023 2:50 PM by Dr. Tony Foley M.D.      US Thoracentesis    Result Date: 9/15/2023  Ultrasound-guided right thoracentesis as described.    This report was finalized on 9/15/2023 12:01 PM by Dr. Deven Zhang M.D.      CT Lower Extremity Right With Contrast    Result Date: 9/12/2023  Compared with MRI 03/21/2023, there has been significant enlargement in the intramuscular anterolateral right thigh lesion in the vastus lateralis muscle with dimensions as above. The  "enlargement appears to be primarily due to increased volume of fluid within the lesion. An irregular rind of thickened tissue around the periphery of the lesion is again observed and as stated previously, differential of chronic hematoma versus hemorrhagic solid/cystic tumor remains.  This report was finalized on 9/12/2023 6:52 PM by Dr. Victor Manuel Son M.D.         Pathology:    9/14/2023  Final Diagnosis   Soft tissue, right thigh, biopsy: Extraskeletal osteosarcoma, [facility] grade 3/3.       Labs:    Lab Results   Component Value Date    CREATININE 1.06 09/17/2023             Problem List:  Patient Active Problem List   Diagnosis    Stenosis, spinal, lumbar    Arthritis of right knee    Mass of right lower extremity    HLD (hyperlipidemia)    Chronic back pain    Anemia    Primary malignant neoplasm of right thigh          Medications:  Current Outpatient Medications on File Prior to Visit   Medication Sig Dispense Refill    acetaminophen (TYLENOL) 500 MG tablet Take 1 tablet by mouth Every 6 (Six) Hours As Needed for Mild Pain.      NON FORMULARY Omega 3 joint pain      pantoprazole (PROTONIX) 40 MG EC tablet Take 1 tablet by mouth Every Morning for 30 days. 30 tablet 0    rosuvastatin (CRESTOR) 10 MG tablet Take 1 tablet by mouth Daily. 90 tablet 3    traZODone (DESYREL) 50 MG tablet Take 0.5 tablets by mouth At Night As Needed for Sleep. 30 tablet 0     No current facility-administered medications on file prior to visit.          Allergies:  No Known Allergies        Vital Signs:  /60   Pulse 103   Wt 76.8 kg (169 lb 6.4 oz)   SpO2 98%   BMI 26.12 kg/m²   Estimated body mass index is 26.12 kg/m² as calculated from the following:    Height as of 5/11/23: 171.5 cm (67.52\").    Weight as of this encounter: 76.8 kg (169 lb 6.4 oz).  Pain Score    09/21/23 1048   PainSc:   4   PainLoc: Leg         ECOG: Ambulatory and capable of all selfcare but unable to carry out any work activities; up and about more " than 50% of waking hours = 2    Physical Exam  Constitutional:       General: He is not in acute distress.  HENT:      Head: Normocephalic and atraumatic.   Pulmonary:      Effort: Pulmonary effort is normal. No respiratory distress.   Neurological:      Mental Status: He is alert and oriented to person, place, and time. Mental status is at baseline.   Psychiatric:         Mood and Affect: Mood normal.         Behavior: Behavior normal.          Result Review :             Diagnoses and all orders for this visit:    1. Primary malignant neoplasm of right thigh (Primary)        Assessment:    Enrique Gillette is a 89 y.o. male with a right thigh mass that has been growing. Biopsy on 9/14/2023 showed an extraskeletal osteosarcoma with outside pathology review sent to [facility]. He is here to discuss treatment options.    Plan:    - Final decision making will occur after patient and family have met with all 3 providers    We reviewed the natural history of the patient's disease and discussed his work-up to date.  We discussed his unique pathology of an extraosseous osteosarcoma.  We discussed that this is also a grade 3 tumor and has shown significant growth since his MRI in March.  Given the unique histology we discussed recommended treatment options.  We reviewed a recent publication by Enio et al which included patients from 25 tertiary level sarcoma referral centers.  In this trial 370 patients with localized extraskeletal osteosarcoma that were treated definitively with surgery were evaluated.  122 patients underwent surgical resection alone, 96 received surgery and chemotherapy, 70 received surgery and radiation therapy, and 82 received surgery chemotherapy and adjuvant radiation.  In this publication radiation was noted to significantly decrease local recurrence when added with surgery.  Based on the results of this paper I discussed a curative intent treatment with radiation therapy followed by  surgery and plus or minus chemotherapy would be reasonable to consider.  We also discussed potential acute and late side effects of treatment.  The patient and their family have significant concerns about undergoing a major operation at the patient's age of 89.  Given this concern we discussed different options.  We discussed definitive treatment with radiation alone.  This would likely require up to 7 weeks of daily radiation and could also have significant toxicity.  If the patient is leaning towards a more palliative approach to treatment we also discussed different palliative regimens which could include single regimen or combinations of radiation therapy, minor surgery or fluid aspiration from the necrotic central core of the tumor, and/or systemic therapy.  We also discussed the option of no further treatment and seeking more palliative care through a program like hospice.  At this time the patient and family would like to meet with the other providers before making any decisions.  They were encouraged to reach out to our office with any questions or concerns or if they would like to schedule any type of treatment.  We also discussed the possibility of physical therapy to help improve his baseline strength.  Again the stated they would let us know if this was something they wanted to pursue in the future.       I spent 45 minutes caring for Enrique on this date of service. This time includes time spent by me in the following activities:preparing for the visit, reviewing tests, obtaining and/or reviewing a separately obtained history, performing a medically appropriate examination and/or evaluation , counseling and educating the patient/family/caregiver, referring and communicating with other health care professionals , documenting information in the medical record, and care coordination  Follow Up   No follow-ups on file.  Patient was given instructions and counseling regarding his condition or for health  maintenance advice. Please see specific information pulled into the AVS if appropriate.     Brian Manning MD

## 2023-09-21 NOTE — PROGRESS NOTES
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    On 09/21/2023 I had the opportunity to see in the office this 89-year-old male who I saw in the hospital a few days ago when he was admitted with a mass progressive in size in the right thigh for the last several months, specifically since 01/2023 or 02/2023 and throughout soft tissue biopsy documented to be a sarcoma. The patient was found to have a transudative right pleural effusion associated with an elevated proBNP almost in the 4000 category and he was found to have anemia of neoplastic disease and also iron deficiency.    The patient in the interim has come back to the office today to be reviewed along with his 2 daughters. He has been doing poorly at home to the point that he does not have an appetite, he has profound fatigue to the point that he stays in bed most of the time and he is barely able to get around the house. He prefers to be sleeping on his right side to minimize shortness of breath. He has no nocturnal cough.  He has progressive swelling in his lower extremities. His bowel activity is okay appropriate with the minimal amount of food that he has and urine volume seems to be appropriate. He has no fever or chills. He has progressive paraneoplastic leukocytosis in the form of a leukemoid reaction.    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    The patient is starting to look to me today clinically like he has very obvious progressive disease substantially and deterioration of his performance status  that will require immediate referral to hospice today. Please review below.       Past Medical History:   Diagnosis Date    Chest pain     Hyperlipidemia     Prostate CA     Prostate cancer     RBBB     Right knee pain     SOB (shortness of breath)         Past Surgical History:   Procedure Laterality Date    CATARACT EXTRACTION Bilateral     COLONOSCOPY  2015        Current Outpatient Medications on File Prior to Visit   Medication Sig Dispense Refill    acetaminophen (TYLENOL) 500 MG tablet Take 1 tablet by mouth Every 6 (Six) Hours As Needed for Mild Pain.      NON FORMULARY Omega 3 joint pain      pantoprazole (PROTONIX) 40 MG EC tablet Take 1 tablet by mouth Every Morning for 30 days. 30 tablet 0    rosuvastatin (CRESTOR) 10 MG tablet Take 1 tablet by mouth Daily. 90 tablet 3    tamsulosin (FLOMAX) 0.4 MG capsule 24 hr capsule       traZODone (DESYREL) 50 MG tablet Take 0.5 tablets by mouth At Night As Needed for Sleep. 30 tablet 0     No current facility-administered medications on file prior to visit.        ALLERGIES:  No Known Allergies     Social History     Socioeconomic History    Marital status:      Spouse name: Jay   Tobacco Use    Smoking status: Never   Vaping Use    Vaping Use: Never used   Substance and Sexual Activity    Alcohol use: No    Drug use: No    Sexual activity: Never        Family History   Problem Relation Age of Onset    No Known Problems Mother     No Known Problems Father     No Known Problems Brother     No Known Problems Brother     No Known Problems Brother     No Known Problems Brother         Review of Systems   General: no fever, no chills, SEVERE fatigue,STATED weight changes, SEVERE lack of appetite.  Eyes: no epiphora, xerophthalmia,conjunctivitis, pain, glaucoma,STATED blurred vision, blindness, secretion, photophobia, proptosis, diplopia.  Ears: no otorrhea, tinnitus, otorrhagia, deafness, pain, vertigo.  Nose: no rhinorrhea, no epistaxis, no alteration in  "perception of odors, no sinuses pressure.  Mouth: no alteration in gums or teeth,  No ulcers, no difficulty with mastication or deglution, no odynophagia.  Neck: no masses or pain, no thyroid alterations, no pain in muscles or arteries, no carotid odynia, no crepitation.  Respiratory: cough, no sputum production, dyspnea,no trepopnea, no pleuritic pain,no hemoptysis.  Heart: no syncope, no irregularity, no palpitations, no angina,no orthopnea,no paroxysmal nocturnal dyspnea.  Vascular Venous: no tenderness,STATED edema,no palpable cords,no postphlebitic syndrome, no skin changes no ulcerations.  Vascular Arterial: no distal ischemia, no claudication, no gangrene, no neuropathic ischemic pain, no skin ulcers, no paleness no cyanosis.  GI: no dysphagia, no odynophagia, no regurgitation, no heartburn,no indigestion,no nausea,no vomiting,no hematemesis ,no melena,no jaundice,no distention, no obstipation,no enterorrhagia,no proctalgia,no anal  lesions, no changes in bowel habits.  : no frequency, no hesitancy, no hematuria, no discharge,no  pain.  Musculoskeletal: STATED muscle or tendon pain or inflammation,no  joint pain, no edema, no functional limitation,no fasciculations, no mass.  Neurologic: no headache, no seizures, no alterations on craneal nerves, no motor deficit, no sensory deficit, normal coordination, no alteration in memory,normal orientation, calculation,normal writing, verbal and written language.  Skin: no rashes,no pruritus no localized lesions.  Psychiatric: STATED anxiety, no depression,no agitation, no delusions, proper insight.      Objective     Vitals:    09/21/23 1303   BP: 112/65   Pulse: 94   Resp: 16   Temp: 97.3 °F (36.3 °C)   TempSrc: Temporal   SpO2: 95%   Weight: 77.1 kg (170 lb)   Height: 171.5 cm (67.52\")   PainSc:   6   PainLoc: Leg  Comment: right thigh pain         9/21/2023     1:06 PM   Current Status   ECOG score 1       Physical Exam        GENERAL:  Well-developed, Patient  " in CHRONIC ILLNESS PROFOUNDLY WEAK, PALE, IN A WHEEL CHAIR  SKIN:  Warm, dry ,NO purpura ,no rash.  HEENT:  Pupils were equal and reactive to light and accomodation, conjunctivae noninjected,  normal visual acuity.   NECK:  Supple with good range of motion; no thyromegaly , no JVD or bruits,.No carotid artery pain, no carotid abnormal pulsation   LYMPHATICS:  No cervical, NO supraclavicular, NO axillary, NO inguinal adenopathies.  CARDIAC   normal rate , Irregular rhythm, ATRIAL FIBRILLATION CVR without murmur,NO rubs NO S3 NO S4   LUNGS: normal breath sounds LEFT , DECREASED BS R BASE no wheezing, NO rhonchi, NO crackles ,NO rubs.  VASCULAR VENOUS: no cyanosis, NO collateral circulation, NO varicosities, 2 + BOTH LE edema, NO palpable cords, NO pain,NO erythema, NO pigmentation of the skin.  ABDOMEN:  Soft, NO pain,no hepatomegaly, no splenomegaly,no masses, no ascites, no collateral circulation,no distention.  EXTREMITIES  AND SPINE:  No clubbing, no cyanosis ,no deformities , no pain .No kyphosis,  no pain in spine, no pain in ribs , no pain in pelvic bone.LARGE 20 CM MASS WARM IN RIGHT ANTERIOR THIGH  NEUROLOGICAL:  Patient was awake, alert, oriented to time, person and place.      RECENT LABS:  Hematology WBC   Date Value Ref Range Status   09/21/2023 21.20 (H) 3.40 - 10.80 10*3/mm3 Final     RBC   Date Value Ref Range Status   09/21/2023 3.95 (L) 4.14 - 5.80 10*6/mm3 Final     Hemoglobin   Date Value Ref Range Status   09/21/2023 9.9 (L) 13.0 - 17.7 g/dL Final     Hematocrit   Date Value Ref Range Status   09/21/2023 32.9 (L) 37.5 - 51.0 % Final     Platelets   Date Value Ref Range Status   09/21/2023 354 140 - 450 10*3/mm3 Final       CBC:    WBC   Date Value Ref Range Status   09/21/2023 21.20 (H) 3.40 - 10.80 10*3/mm3 Final     RBC   Date Value Ref Range Status   09/21/2023 3.95 (L) 4.14 - 5.80 10*6/mm3 Final     Hemoglobin   Date Value Ref Range Status   09/21/2023 9.9 (L) 13.0 - 17.7 g/dL Final      Hematocrit   Date Value Ref Range Status   09/21/2023 32.9 (L) 37.5 - 51.0 % Final     MCV   Date Value Ref Range Status   09/21/2023 83.3 79.0 - 97.0 fL Final     MCH   Date Value Ref Range Status   09/21/2023 25.1 (L) 26.6 - 33.0 pg Final     MCHC   Date Value Ref Range Status   09/21/2023 30.1 (L) 31.5 - 35.7 g/dL Final     RDW   Date Value Ref Range Status   09/21/2023 16.7 (H) 12.3 - 15.4 % Final     RDW-SD   Date Value Ref Range Status   09/21/2023 51.0 37.0 - 54.0 fl Final     MPV   Date Value Ref Range Status   09/21/2023 10.7 6.0 - 12.0 fL Final     Platelets   Date Value Ref Range Status   09/21/2023 354 140 - 450 10*3/mm3 Final     Neutrophil %   Date Value Ref Range Status   09/21/2023 85.7 (H) 42.7 - 76.0 % Final     Lymphocyte %   Date Value Ref Range Status   09/21/2023 4.2 (L) 19.6 - 45.3 % Final     Monocyte %   Date Value Ref Range Status   09/21/2023 7.6 5.0 - 12.0 % Final     Eosinophil %   Date Value Ref Range Status   09/21/2023 1.6 0.3 - 6.2 % Final     Basophil %   Date Value Ref Range Status   09/21/2023 0.2 0.0 - 1.5 % Final     Immature Grans %   Date Value Ref Range Status   09/21/2023 0.7 (H) 0.0 - 0.5 % Final     Neutrophils, Absolute   Date Value Ref Range Status   09/21/2023 18.18 (H) 1.70 - 7.00 10*3/mm3 Final     Lymphocytes, Absolute   Date Value Ref Range Status   09/21/2023 0.89 0.70 - 3.10 10*3/mm3 Final     Monocytes, Absolute   Date Value Ref Range Status   09/21/2023 1.62 (H) 0.10 - 0.90 10*3/mm3 Final     Eosinophils, Absolute   Date Value Ref Range Status   09/21/2023 0.33 0.00 - 0.40 10*3/mm3 Final     Basophils, Absolute   Date Value Ref Range Status   09/21/2023 0.04 0.00 - 0.20 10*3/mm3 Final     Immature Grans, Absolute   Date Value Ref Range Status   09/21/2023 0.14 (H) 0.00 - 0.05 10*3/mm3 Final     nRBC   Date Value Ref Range Status   09/21/2023 0.0 0.0 - 0.2 /100 WBC Final        CMP:    Glucose   Date Value Ref Range Status   09/21/2023 124 (H) 65 - 99 mg/dL  Final     BUN   Date Value Ref Range Status   09/21/2023 24 (H) 8 - 23 mg/dL Final     Creatinine   Date Value Ref Range Status   09/21/2023 1.12 0.70 - 1.30 mg/dL Final   03/21/2023 1.30 0.60 - 1.30 mg/dL Final     Comment:     Serial Number: [id number]:  [id number]     Sodium   Date Value Ref Range Status   09/21/2023 136 136 - 145 mmol/L Final     Potassium   Date Value Ref Range Status   09/21/2023 4.4 3.5 - 5.2 mmol/L Final     Chloride   Date Value Ref Range Status   09/21/2023 101 98 - 107 mmol/L Final     CO2   Date Value Ref Range Status   09/21/2023 19.8 (L) 22.0 - 29.0 mmol/L Final     Calcium   Date Value Ref Range Status   09/21/2023 8.5 8.2 - 9.6 mg/dL Final     Total Protein   Date Value Ref Range Status   09/21/2023 6.4 6.0 - 8.5 g/dL Final     Albumin   Date Value Ref Range Status   09/21/2023 2.7 (L) 3.5 - 5.2 g/dL Final     ALT (SGPT)   Date Value Ref Range Status   09/21/2023 45 (H) 1 - 41 U/L Final     AST (SGOT)   Date Value Ref Range Status   09/21/2023 68 (H) 1 - 40 U/L Final     Alkaline Phosphatase   Date Value Ref Range Status   09/21/2023 355 (H) 39 - 117 U/L Final     Total Bilirubin   Date Value Ref Range Status   09/21/2023 1.2 0.0 - 1.2 mg/dL Final     Globulin   Date Value Ref Range Status   09/21/2023 3.7 gm/dL Final     A/G Ratio   Date Value Ref Range Status   09/21/2023 0.7 g/dL Final     BUN/Creatinine Ratio   Date Value Ref Range Status   09/21/2023 21.4 7.0 - 25.0 Final     Anion Gap   Date Value Ref Range Status   09/21/2023 15.2 (H) 5.0 - 15.0 mmol/L Final         Non-gynecologic Cytology: [id number]  Order: [id number]  Status: Final result       Visible to patient: No (scheduled for 9/22/2023 12:02 PM)       Next appt: 09/26/2023 at 11:15 AM in Family Medicine (Nazanin Lucio MD)    Specimen Information: Pleural Cavity; Body Fluid   0 Result Notes      Component    Case Report   Non-gynecologic Cytology                          Case: [id number]                                    Authorizing Provider:  Tyrell Cueto MD          Collected:           09/15/2023 05:39 PM           Ordering Location:     [facility]  Received:            09/15/2023 05:39 PM                                  [address]                                                                       Pathologist:           Charles Troy MD                                                         Specimen:    Pleural Cavity, Right Thoracentesis                                                        Final Diagnosis   1. Pleural Fluid, Right, Thoracentesis:               A. Negative for malignant cells.               B. Rare reactive mesothelial cells, scattered histiocytes, lymphocytes, blood, and abundant fibrin.   Electronically signed by Charles Troy MD on 9/19/2023 at 1202   Gross Description    1. Pleural Cavity.  Right: 1 centrifuge tube received containing 7.5 ml of pale yellow fluid with fibrin. ThinPrep(1), cell block. No remaining fluid. Cellblock in formalin @ 10:43 on 9/18/23.      Microscopic Description    Screened by WILLI Gregory.   Resulting Agency  [facility]      tains abnormal data BNP  Order: [id number]  Status: Final result       Visible to patient: Yes (seen)       Next appt: 09/26/2023 at 11:15 AM in Family Medicine (Nazanin Lucio MD)    Specimen Information: Blood   0 Result Notes      Component  Ref Range & Units 5 d ago   proBNP  0.0 - 1,800.0 pg/mL 2,847.0 High    Resulting Agency  [facility]           Narrative  Performed by:  [facility]  Among patients with dyspnea, NT-proBNP is highly sensitive for the detection of acute congestive heart failure. In addition NT-proBNP of <300 pg/ml effectively rules out acute congestive heart failure with 99% negative predictive value.      Specimen Collected: 09/16/23 06:51 EDT Last Resulted: 09/16/23 11:02 EDT           Recent imaging:    CT Chest With Contrast Diagnostic, CT Abdomen Pelvis With Contrast    Result Date: 9/14/2023  Narrative:  Examination: CT of the chest, abdomen, and pelvis with contrast  TECHNIQUE: CT of the chest, abdomen, and pelvis after the uneventful administration of nonionic intravenous contrast per protocol. Radiation dose reduction techniques were utilized, including automated exposure control and exposure modulation based on body size.  HISTORY: Metastatic disease evaluation. Musculoskeletal neoplasm in the right thigh  COMPARISON:None available  FINDINGS: Chest:  There are moderate sized right and small left pleural effusions. Dependent atelectasis is seen, without consolidation or pneumothorax. There is old granulomatous disease. The central airways are patent.  No enlarged supraclavicular, axillary, mediastinal, or hilar lymph nodes are seen. The mediastinal vasculature is normal in caliber. There are coronary calcifications. The heart is mildly enlarged, without pericardial effusion.  Abdomen and pelvis:  Old granulomatous disease is seen in the liver and spleen. Low-attenuation right renal lesions are technically indeterminate, likely cysts. The left kidney is absent. There is a left-sided urinary bladder diverticulum. A pancreatic calcification is compatible with chronic pancreatitis.  The liver, gallbladder, spleen, adrenal glands,, stomach, small bowel, appendix, large bowel, and abdominal vasculature are normal. No intraperitoneal fluid collection or free gas are seen. No enlarged lymph nodes are demonstrated.  Bone windows demonstrate degenerative changes, without suspicious osseous lesion seen.  There is an incompletely evaluated right thigh mass, measuring 8 x 5.3 cm on series 2, image 105.      Impression: 1. Incompletely evaluated right thigh mass  2. Moderate size right and small left pleural effusions  3. Incidental findings as above, without convincing evidence of metastatic disease in the chest, abdomen, or pelvis.   This report was finalized on 9/14/2023 2:50 PM by Dr. Tony Foley M.D.      US Guided  Tissue Biopsy    Result Date: 9/14/2023  Narrative: US GUIDED TISSUE ASPIRATION AND BIOPSY OF RIGHT ANTERIOR THIGH MASS 09/14/2023  HISTORY: Right anterior thigh mass.  After signed informed consent was obtained the anterior right thigh was prepped and draped in the usual sterile fashion. Lidocaine was used for local anesthesia.  18-gauge needle was introduced into the hypoechoic collection of the anterior right thigh. Approximately 110 mL of nonpurulent serosanguineous fluid was aspirated. This was followed by core biopsies of a more solid-appearing component of the mass using an 18-gauge biopsy device.  Confirmatory images were obtained.  Patient tolerated the procedure well with no complications. Pathology report is pending.  This report was finalized on 9/14/2023 2:57 PM by Dr. Deven Zhang M.D.      XR Chest 1 View    Result Date: 9/16/2023  Narrative: ONE-VIEW PORTABLE CHEST  HISTORY: Follow-up evaluation after right thoracentesis.  FINDINGS: The lungs are clear except for some minimal vague atelectasis at the left base and the moderate right pleural effusion noted on the CT scan performed 2 days ago is no longer visible. There is no pneumothorax following thoracentesis. The heart remains enlarged.  This report was finalized on 9/16/2023 7:45 AM by Dr. Raman Faulkner M.D.      Duplex Venous Lower Extremity - Right CAR    Result Date: 9/12/2023  Narrative:   Normal right lower extremity venous duplex scan.   Heterogeneous, predominantly hypoechoic collection anterior right thigh measuring 17 x 9 cm, possibly consistent with hematoma.     US Thoracentesis    Result Date: 9/15/2023  Narrative: ULTRASOUND-GUIDED RIGHT THORACENTESIS. 9/15/2023  HISTORY: Pleural effusion.  After signed informed consent was obtained the patient was prepped and draped in the upright sitting position. Lidocaine was used for local anesthesia.  Ultrasound guidance was used to place the thoracentesis catheter into the right pleural  fluid collection. 600 cc was removed.  Confirmatory images were obtained.  Patient tolerated the procedure well with no complications.      Impression: Ultrasound-guided right thoracentesis as described.    This report was finalized on 9/15/2023 12:01 PM by Dr. Deven Zhang M.D.      CT Lower Extremity Right With Contrast    Result Date: 9/12/2023  Narrative: RIGHT THIGH CT WITH CONTRAST  HISTORY: Follow-up enlarging soft tissue mass in the thigh. MRI in March 2023 demonstrated an 8 x 4 x 10 cm long intramuscular right thigh lesion with hemorrhagic components. On the MRI, differential considerations of hematoma with adjacent inflammatory response versus solid mass lesion with hemorrhage were provided.  TECHNIQUE: Right thigh CT with 85 mL of Isovue-300 IV contrast is provided and correlated with the MRI from 03/21/2023. Radiation dose reduction techniques were utilized, including automated exposure control and exposure modulation based on body size.   FINDINGS: The fluid component of the right thigh lesion has substantially increased in size in the interval since the prior exam. The lesion measures 19 cm long and 8 x 11 cm axial and is positioned within the vastus lateralis muscle. Most of the volume of the lesion comprises fluid signal. There is a rind of intermediate density, possibly enhancing material around the periphery of the lesion measuring up to about 12 mm thick. This is most pronounced along the proximal and anterior portions of the lesion.  No lymphadenopathy. Anterior thigh musculature is otherwise preserved. There is fatty atrophic change of the semimembranosus muscle and partially along the biceps femoris. No joint effusion at the knee or the hip. No bone lesion. Arthritis at the hip.      Impression: Compared with MRI 03/21/2023, there has been significant enlargement in the intramuscular anterolateral right thigh lesion in the vastus lateralis muscle with dimensions as above. The enlargement  appears to be primarily due to increased volume of fluid within the lesion. An irregular rind of thickened tissue around the periphery of the lesion is again observed and as stated previously, differential of chronic hematoma versus hemorrhagic solid/cystic tumor remains.  This report was finalized on 9/12/2023 6:52 PM by Dr. Victor Manuel Son M.D.        Assessment & Plan     1. This patient has a sarcoma clinically and radiologically through MRI and documented pathologically through a biopsy in the anterior right thigh. The tumor is almost 20 cm in size and has been biopsied.     Obviously at 89 with the deterioration of performance status that he has had since a few days ago after the hospital admission obviously this patient is not a surgical candidate, is not a radiation therapy candidate and is just a terminal care patient candidate to be seen by hospice as soon as possible.     It is very obvious that there is no room to do any kind of intervention directed to the tumor and the only intervention directed to the tumor given the fact that he is having pain localized in this area is to give him a minimal amount of pain medicine in the form of Lortab to take 1/2 a tablet 2-3 times a day on prn basis.     A prescription will be sent for this purpose.     2. The 2nd phenomenon that the patient has is congestive heart failure. He is in atrial fibrillation with controlled ventricular response with a proBNP almost in the 3000 category, right transudative pleural effusion. He has significant progressive fluid accumulation in the lower extremities since discharge and the patient will require very gentle diuresis. I will send a prescription for Lasix 10-mg once a day in the morning. The patient is not able to have too much of continence, this could become a problem and this will require diaper management.    3. The patient has profound anorexia and fatigue neoplastic disease. The only way to control this issue will be to  give him a minimal amount of prednisone at the dose of 10 mg a day to see if this makes him feel any better. Typically hospice provides dexamethasone, this typical dose in my opinion is too aggressive and produces agitation and insomnia that I do not want this patient to have.     4. In regard to this patient's anemia, it is a combination of anemia neoplastic disease and iron deficiency. We documented that his nutrition has been very poor during the last several months. The patient is supposed to take a prenatal vitamin 3 times a week. We advised him and his family to continue doing this.     5. The patient has a leukemoid reaction that is on the basis of neutrophils in absence of any obvious infection. He has no pneumonia, he has no urinary tract infection. The urinalysis in the hospital on 2 different occasions was completely clear and he has no obvious infection at the tumor site. In other words he has a paraneoplastic leukocytosis related to the sarcoma and there is nothing that we can do about it.    I was clear to the patient that life expectancy is measured in days. I asked him to prepare and to have all of his affairs in order now that he remains competent and capable of talking to his children in regard his present and his future. I expressed to the children in private the fact that I do believe the patient is going to pass before his wife is going to pass. She has advanced dementia and now she is receiving total care with a person that has been hired by the children.     I advised the children that I would not be surprised if the father dies first and subsequently the mother dies very quickly. I have seen this on very repetitious occasions through my lifetime and is called tandem death. I asked them to be prepared for that issue and circumstance.     I will propose a video visit with him in a week that way he does not need to be brought back. My nurses have sent a referral to hospice today and that way  they assume the care of this patient as soon as possible. He will require a home care package and so forth and proper assessment by ,  and so forth as well.     Even hospice also could tentatively assume the care of the wife through hospice services as well.     It is unfortunate to see all of this phenomenon happen in a very short period of time, the patient has no reserve, the tumor has been growing at least since 03/2023 and obviously it is too late for us to do any intervention at this point.     No further laboratory workup will be done. I provided copies of the reports of the cytology of the pleural effusion, the flow cytometry that was negative for malignancy and the serum protein immunoelectrophoresis that disclosed no evidence of monoclonal protein. The final analysis of the tissue at the [facility] for the sarcoma is still pending.     We have obtained today a [facility] liquid biopsy. We will stop this from being sent. There is no purpose for this at this time.

## 2023-09-21 NOTE — PROGRESS NOTES
Oncology Social Work    Diagnosis: Osteosarcoma   Treatment: TBD       Physical Concerns:    Pain: Y  Sleep: Y    Practical Problems: Patient's wife has Alzheimer's Disease and is 88 yrs old.  Patient was primary caregiver to her.         Emotional Concerns: Anxiety        Family Concerns: patient has 4 children - all live out of town, but currently they are in town and helping with practical needs and their mother.   There is concern of their mother's reaction when the time comes to move her into a memory care unit at [facility].      Spiritual Concerns: strong virginie.         Interventions:   OSW met with pt and 2 of his daughters after their appt with Dr. Manning in [place].  Patient has understanding of diagnosis. Treatment plan is still being considered.  Reviewed social work service with patient and daughters.    Many questions answered related to home care and [facility]/ palliative care services at home.  Patient uses a rolling walker to ambulate and is still independent with ADL's .  He does report pain and fatigue.  Patient states that he has looked into assisted living / memory care facilities for the last 8 years and has a place picked out near his home - [facility].      Patient has a living will, POA paperwork, Estate planning and a will completed.  He states that he is alittle anxious and of course his worry is regarding his wife.  Will remain available and will assist as needs arise.  Daughter has my contact letter/ card.    Chata Camargo, ESTHERW, LCSW

## 2023-09-22 ENCOUNTER — DOCUMENTATION (OUTPATIENT)
Dept: ONCOLOGY | Facility: CLINIC | Age: 88
End: 2023-09-22
Payer: MEDICARE

## 2023-09-22 LAB
DX PRELIMINARY: NORMAL
LAB AP CASE REPORT: NORMAL
LAB AP CLINICAL INFORMATION: NORMAL
LAB AP SPECIAL STAINS: NORMAL
PATH REPORT.ADDENDUM SPEC: NORMAL
PATH REPORT.FINAL DX SPEC: NORMAL
PATH REPORT.GROSS SPEC: NORMAL

## 2023-09-22 NOTE — PROGRESS NOTES
Approved today  Request Reference Number: [id number]. HYDROCO/APAP TAB 5-325MG is approved through 12/31/2023. Your patient may now fill this prescription and it will be covered.  Drug  HYDROcodone-Acetaminophen 5-325MG tablets  Form  [employer] Medicare Part D Electronic Prior Authorization Form (2017 NCPDP)  Original Claim Info  [phone]

## 2023-09-27 ENCOUNTER — READMISSION MANAGEMENT (OUTPATIENT)
Dept: CALL CENTER | Facility: HOSPITAL | Age: 88
End: 2023-09-27
Payer: MEDICARE

## 2023-09-27 NOTE — OUTREACH NOTE
Medical Week 2 Survey      Flowsheet Row Responses   [facility] patient discharged from? [place]   Does the patient have one of the following disease processes/diagnoses(primary or secondary)? Other   Week 2 attempt successful? Yes   Call start time 1031   Discharge diagnosis *Mass of right lower extremity   Call end time 1032   Person spoke with today (if not patient) and relationship daughter   Meds reviewed with patient/caregiver? Yes   Is the patient having any side effects they believe may be caused by any medication additions or changes? No   Does the patient have all medications ordered at discharge? Yes   Is the patient taking all medications as directed (includes completed medication regime)? Yes   What is the Home health agency?  Hospice   Has home health visited the patient within 72 hours of discharge? Yes   Psychosocial issues? No   What is the patient's perception of their health status since discharge? Worsening   Week 2 Call Completed? Yes   Graduated Yes   Wrap up additional comments Pt has hospice now as care. Declining per Daughter.   Call end time 1032            Ena PERDOMO - Registered Nurse

## 2023-09-28 ENCOUNTER — TELEPHONE (OUTPATIENT)
Dept: ONCOLOGY | Facility: CLINIC | Age: 88
End: 2023-09-28
Payer: MEDICARE

## 2023-09-28 NOTE — TELEPHONE ENCOUNTER
----- Message from Idalia Álvarez sent at 9/22/2023  8:41 AM EDT -----  Regarding: NEEDS VIDEO VISIT NEXT WK  I AM SO SORRY, I WAS GOING TO PUT THE VIDEO VISIT OVER DOC2 ON FRIDAY BUT THEY ARE TELLING ME HE DOESN'T LIKE THAT?

## 2023-10-09 ENCOUNTER — TELEPHONE (OUTPATIENT)
Dept: FAMILY MEDICINE CLINIC | Facility: CLINIC | Age: 88
End: 2023-10-09
Payer: MEDICARE

## 2023-10-11 ENCOUNTER — TELEPHONE (OUTPATIENT)
Dept: FAMILY MEDICINE CLINIC | Facility: CLINIC | Age: 88
End: 2023-10-11
Payer: MEDICARE

## 2023-10-11 RX ORDER — ROSUVASTATIN CALCIUM 10 MG/1
10 TABLET, COATED ORAL DAILY
Qty: 90 TABLET | Refills: 0 | Status: SHIPPED | OUTPATIENT
Start: 2023-10-11

## 2023-10-11 RX ORDER — ROSUVASTATIN CALCIUM 10 MG/1
10 TABLET, COATED ORAL DAILY
COMMUNITY
End: 2023-10-11 | Stop reason: SDUPTHER

## 2023-10-11 NOTE — TELEPHONE ENCOUNTER
Denied refill of rosuvastatin 10mg to [facility] due to not being seen since , patient must be seen by new pcp and have labs. Also this is no longer on patient list.  Faxed form.

## 2024-01-24 ENCOUNTER — TELEPHONE (OUTPATIENT)
Dept: ONCOLOGY | Facility: CLINIC | Age: 89
End: 2024-01-24

## 2024-01-24 NOTE — TELEPHONE ENCOUNTER
“Please be informed that patient has passed. Patient has been marked  in the system. The date of death is: 24.    Caller: IGNACIO RUBALCAVA/ [place]        Best call back number: [phone]